# Patient Record
Sex: MALE | Race: WHITE | Employment: FULL TIME | URBAN - METROPOLITAN AREA
[De-identification: names, ages, dates, MRNs, and addresses within clinical notes are randomized per-mention and may not be internally consistent; named-entity substitution may affect disease eponyms.]

---

## 2017-01-26 ENCOUNTER — OFFICE VISIT (OUTPATIENT)
Dept: FAMILY MEDICINE CLINIC | Facility: CLINIC | Age: 42
End: 2017-01-26

## 2017-01-26 VITALS
WEIGHT: 190 LBS | SYSTOLIC BLOOD PRESSURE: 115 MMHG | DIASTOLIC BLOOD PRESSURE: 74 MMHG | BODY MASS INDEX: 25.18 KG/M2 | HEART RATE: 102 BPM | HEIGHT: 73 IN | TEMPERATURE: 98 F

## 2017-01-26 DIAGNOSIS — D72.819 LEUKOPENIA, UNSPECIFIED TYPE: ICD-10-CM

## 2017-01-26 DIAGNOSIS — Z12.11 COLON CANCER SCREENING: ICD-10-CM

## 2017-01-26 DIAGNOSIS — Z00.00 ROUTINE PHYSICAL EXAMINATION: Primary | ICD-10-CM

## 2017-01-26 DIAGNOSIS — J31.0 CHRONIC RHINITIS: ICD-10-CM

## 2017-01-26 PROCEDURE — 99396 PREV VISIT EST AGE 40-64: CPT | Performed by: FAMILY MEDICINE

## 2017-01-26 NOTE — PROGRESS NOTES
HPI:    Patient ID: Mariusz Still is a 37year old male. HPI Comments: Patient is here for routine physical exam. No acute issues. No significant chronic medical problems. Patient is requesting testing. Diet and exercise have been fair.  Past medical wheezes. He has no rales. Abdominal: Soft. Bowel sounds are normal. He exhibits no distension. There is no tenderness. There is no rebound and no guarding. Musculoskeletal: Normal range of motion. He exhibits no edema or tenderness.    Lymphadenopathy:

## 2017-02-02 ENCOUNTER — LAB ENCOUNTER (OUTPATIENT)
Dept: LAB | Age: 42
End: 2017-02-02
Attending: FAMILY MEDICINE

## 2017-02-02 DIAGNOSIS — Z00.00 ROUTINE PHYSICAL EXAMINATION: ICD-10-CM

## 2017-02-02 LAB
ALBUMIN SERPL BCP-MCNC: 4.2 G/DL (ref 3.5–4.8)
ALBUMIN/GLOB SERPL: 1.6 {RATIO} (ref 1–2)
ALP SERPL-CCNC: 43 U/L (ref 32–100)
ALT SERPL-CCNC: 18 U/L (ref 17–63)
ANION GAP SERPL CALC-SCNC: 9 MMOL/L (ref 0–18)
AST SERPL-CCNC: 18 U/L (ref 15–41)
BASOPHILS # BLD: 0 K/UL (ref 0–0.2)
BASOPHILS NFR BLD: 1 %
BILIRUB SERPL-MCNC: 0.9 MG/DL (ref 0.3–1.2)
BUN SERPL-MCNC: 12 MG/DL (ref 8–20)
BUN/CREAT SERPL: 12.9 (ref 10–20)
CALCIUM SERPL-MCNC: 9.4 MG/DL (ref 8.5–10.5)
CHLORIDE SERPL-SCNC: 103 MMOL/L (ref 95–110)
CHOLEST SERPL-MCNC: 176 MG/DL (ref 110–200)
CO2 SERPL-SCNC: 28 MMOL/L (ref 22–32)
CREAT SERPL-MCNC: 0.93 MG/DL (ref 0.5–1.5)
EOSINOPHIL # BLD: 0 K/UL (ref 0–0.7)
EOSINOPHIL NFR BLD: 1 %
ERYTHROCYTE [DISTWIDTH] IN BLOOD BY AUTOMATED COUNT: 12.8 % (ref 11–15)
GLOBULIN PLAS-MCNC: 2.6 G/DL (ref 2.5–3.7)
GLUCOSE SERPL-MCNC: 85 MG/DL (ref 70–99)
HCT VFR BLD AUTO: 44.2 % (ref 41–52)
HDLC SERPL-MCNC: 53 MG/DL
HGB BLD-MCNC: 14.6 G/DL (ref 13.5–17.5)
LDLC SERPL CALC-MCNC: 113 MG/DL (ref 0–99)
LYMPHOCYTES # BLD: 1.6 K/UL (ref 1–4)
LYMPHOCYTES NFR BLD: 51 %
MCH RBC QN AUTO: 32.2 PG (ref 27–32)
MCHC RBC AUTO-ENTMCNC: 33.1 G/DL (ref 32–37)
MCV RBC AUTO: 97.1 FL (ref 80–100)
MONOCYTES # BLD: 0.4 K/UL (ref 0–1)
MONOCYTES NFR BLD: 12 %
NEUTROPHILS # BLD AUTO: 1.1 K/UL (ref 1.8–7.7)
NEUTROPHILS NFR BLD: 35 %
NONHDLC SERPL-MCNC: 123 MG/DL
OSMOLALITY UR CALC.SUM OF ELEC: 289 MOSM/KG (ref 275–295)
PLATELET # BLD AUTO: 164 K/UL (ref 140–400)
PMV BLD AUTO: 9.9 FL (ref 7.4–10.3)
POTASSIUM SERPL-SCNC: 3.9 MMOL/L (ref 3.3–5.1)
PROT SERPL-MCNC: 6.8 G/DL (ref 5.9–8.4)
PSA SERPL-MCNC: 0.8 NG/ML (ref 0–4)
RBC # BLD AUTO: 4.55 M/UL (ref 4.5–5.9)
SODIUM SERPL-SCNC: 140 MMOL/L (ref 136–144)
TRIGL SERPL-MCNC: 50 MG/DL (ref 1–149)
WBC # BLD AUTO: 3.2 K/UL (ref 4–11)

## 2017-02-02 PROCEDURE — 80053 COMPREHEN METABOLIC PANEL: CPT

## 2017-02-02 PROCEDURE — 85025 COMPLETE CBC W/AUTO DIFF WBC: CPT

## 2017-02-02 PROCEDURE — 36415 COLL VENOUS BLD VENIPUNCTURE: CPT

## 2017-02-02 PROCEDURE — 80061 LIPID PANEL: CPT

## 2017-04-20 ENCOUNTER — TELEPHONE (OUTPATIENT)
Dept: FAMILY MEDICINE CLINIC | Facility: CLINIC | Age: 42
End: 2017-04-20

## 2017-04-20 RX ORDER — ALPRAZOLAM 0.25 MG/1
0.25 TABLET ORAL NIGHTLY PRN
Qty: 30 TABLET | Refills: 0 | OUTPATIENT
Start: 2017-04-20 | End: 2017-05-16

## 2017-04-20 NOTE — TELEPHONE ENCOUNTER
Actions Requested: Pt requesting medication for insomnia. Situation/Background   Problem: Insomnia. Sleeping 4 to 6 hours per night. Tired, mind racing.     Onset: 2-3 weeks  Associated Symptoms: Started new job, stressful, mind racing at night time and ca

## 2017-04-20 NOTE — TELEPHONE ENCOUNTER
Pt states that he is having a hard time falling asleep. Pt states that this has been going on for a couple of weeks and would like to know if doctor can prescribe a medication to him. Pt does not want to make an appt at this time.

## 2017-04-20 NOTE — TELEPHONE ENCOUNTER
On hold at [address] [facility] for 5 minutes, switched to voice mail box and left verbal order from Dr CALDERON alprazolam 0/25 mg one tablet by mouth daily as needed for sleep or anxiety. If any questions call nurse at [address] [place], [place] [address]r Elva rodrigues

## 2017-04-20 NOTE — TELEPHONE ENCOUNTER
Message noted. Chart reviewed. May call in xanax as needed /requested. Script reviewed and signed. Please call into pharmacy as this is controlled substance and notify patient. To follow up for appointment if not better.

## 2017-05-16 ENCOUNTER — TELEPHONE (OUTPATIENT)
Dept: GASTROENTEROLOGY | Facility: CLINIC | Age: 42
End: 2017-05-16

## 2017-05-16 ENCOUNTER — OFFICE VISIT (OUTPATIENT)
Dept: GASTROENTEROLOGY | Facility: CLINIC | Age: 42
End: 2017-05-16

## 2017-05-16 ENCOUNTER — OFFICE VISIT (OUTPATIENT)
Dept: FAMILY MEDICINE CLINIC | Facility: CLINIC | Age: 42
End: 2017-05-16

## 2017-05-16 VITALS
HEART RATE: 71 BPM | HEIGHT: 73 IN | BODY MASS INDEX: 24.78 KG/M2 | RESPIRATION RATE: 16 BRPM | TEMPERATURE: 98 F | WEIGHT: 187 LBS | DIASTOLIC BLOOD PRESSURE: 68 MMHG | SYSTOLIC BLOOD PRESSURE: 112 MMHG

## 2017-05-16 VITALS
BODY MASS INDEX: 25.05 KG/M2 | SYSTOLIC BLOOD PRESSURE: 123 MMHG | WEIGHT: 189 LBS | RESPIRATION RATE: 18 BRPM | DIASTOLIC BLOOD PRESSURE: 76 MMHG | TEMPERATURE: 98 F | HEIGHT: 73 IN | HEART RATE: 79 BPM

## 2017-05-16 DIAGNOSIS — J39.2 THROAT IRRITATION: ICD-10-CM

## 2017-05-16 DIAGNOSIS — K21.9 GASTROESOPHAGEAL REFLUX DISEASE, ESOPHAGITIS PRESENCE NOT SPECIFIED: Primary | ICD-10-CM

## 2017-05-16 DIAGNOSIS — Z12.11 SCREENING FOR COLON CANCER: ICD-10-CM

## 2017-05-16 DIAGNOSIS — Z80.0 FAMILY HISTORY OF COLON CANCER: Primary | ICD-10-CM

## 2017-05-16 PROCEDURE — 99212 OFFICE O/P EST SF 10 MIN: CPT | Performed by: FAMILY MEDICINE

## 2017-05-16 PROCEDURE — 99212 OFFICE O/P EST SF 10 MIN: CPT | Performed by: NURSE PRACTITIONER

## 2017-05-16 PROCEDURE — 99243 OFF/OP CNSLTJ NEW/EST LOW 30: CPT | Performed by: NURSE PRACTITIONER

## 2017-05-16 PROCEDURE — 99213 OFFICE O/P EST LOW 20 MIN: CPT | Performed by: FAMILY MEDICINE

## 2017-05-16 RX ORDER — OMEPRAZOLE 40 MG/1
40 CAPSULE, DELAYED RELEASE ORAL DAILY
Qty: 30 CAPSULE | Refills: 2 | Status: SHIPPED | OUTPATIENT
Start: 2017-05-16 | End: 2017-08-12

## 2017-05-16 NOTE — PATIENT INSTRUCTIONS
1. Schedule colonoscopy with Dr. Powers July w/ MAC    2.  bowel prep from pharmacy Suprep (eRx)    3. Continue all medications for procedure    4. Read all bowel prep instructions carefully    5.  AVOID seeds, nuts, popcorn, raw fruits and v

## 2017-05-16 NOTE — H&P
[address] [facility] [address] Gastroenterology                                                                                                  Clinic History and Physical     Pa dysphagia, odynophagia, hematemesis, or melena. Patient has a bowel movement each day. Additionally there is no change of appetite, weight loss, and no reported history of chest pain or shortness of breath.       Pertinent Surgical Hx:  Hemorrhoidectomy  - Oral Solution Take as directed per GI consult notes Disp: 1 Bottle Rfl: 0   Multiple Vitamins-Minerals (MULTI-VITAMIN/MINERALS) Oral Tab Take 1 tablet by mouth daily.  Disp:  Rfl:        Allergies:  No Known Allergies    ROS:   CONSTITUTIONAL:  negative for macrocytosis without anemia, hemorrhoidectomy, + FH colon cancer, who presents for colon cancer screening evaluation. No anemia noted on lab work dated 2/2/17.     1. Screening Colonoscopy: + family hx of colon cancer and it is appropriate to proceed w Sig: Take as directed per GI consult notes           Imaging & Referrals:  None       MARGARITA Barclay  5/16/2017

## 2017-05-16 NOTE — TELEPHONE ENCOUNTER
Pt here in office.      Scheduled for:  Colonoscopy 76533  Provider Name:Dr Dhillon  Date:  Wed June 28, 2017  Location: [facility]   Sedation:  MAC  Time:  9:45am, arrival 8: 45am  Prep:Suprep, sent per Kristal Yao to pharmacy  Meds/Allergies Reconciled?: yes   D

## 2017-05-16 NOTE — PROGRESS NOTES
HPI:    Patient ID: Franki Navarro is a 37year old male. HPI Comments: Pt presents with a cold feeling of his throat to upper chest area over the last couple months in the afternoons. Pt has had no pains. Pt has had no pattern with foods/ drinks.  No evaluation and treatment; To call if any significant symptoms. After discussion with patient, omeprazole as directed; Encouraged bland diet and lifestyle changes; Follow up as needed. No orders of the defined types were placed in this encounter.

## 2017-05-16 NOTE — TELEPHONE ENCOUNTER
Saw patient in office today. Pt having GERD and throat symptoms with eating over the last month. Hx of GERD in past. Pt requesting appointment to discuss possible upper endoscopy. Pt is already scheduled for colonoscopy.

## 2017-05-17 NOTE — TELEPHONE ENCOUNTER
Scheduled for:  Colonoscopy 54340 ADDED EGD 70863  Provider Name:  Dr. Nadia Ignacio  Date:    From-6/28/17  To-6/27/17  Location:  [facility]  Sedation:  MAC  Time:   From-0945  To-0930 (pt is aware to arrive at 0830)  Prep:  Suprep, mailed revised instructions on 5/17/17

## 2017-06-26 ENCOUNTER — TELEPHONE (OUTPATIENT)
Dept: GASTROENTEROLOGY | Facility: CLINIC | Age: 42
End: 2017-06-26

## 2017-06-26 NOTE — TELEPHONE ENCOUNTER
Pt did not follow instructions for prep kit correctly and is concerned if he is still ok to have CLN tomorrow.  Please Call Thank You

## 2017-06-26 NOTE — TELEPHONE ENCOUNTER
Pt contacted.   He did not follow prep instructions and ate a [place] around 12-1pm with virgen and dereck    Reviewed need to reschedule    Pt may be starting a new job, if so he will call us to reschedule    Otherwise we have rescheduled pt to    [facility]

## 2017-06-26 NOTE — TELEPHONE ENCOUNTER
Reviewed with pt that he is at [facility], given directions and that his arrival time for egd/colon is 8:30am. No further questions.

## 2017-07-07 NOTE — TELEPHONE ENCOUNTER
Scheduled for:  Colonoscopy 98417 and EGD [address]  Provider Name: Dr. Naty Lim  Date:  7/11/17  Location:  [facility]  Sedation:  MAC  Time:  9911 (pt is aware to arrive at 0945)   Prep:  Suprep, pt has instructions  Meds/Allergies Reconciled?:  Physician reviewed   Diag

## 2017-07-07 NOTE — TELEPHONE ENCOUNTER
Dr. Tyrone Houston called Erica to schedule this patient on 7/12/17 at 0930 since the block has dropped but couldn't  schedule him at that time but could at 1300 which Alcira and I thought that would be to late.   Erica gave me the choice to 7/11/17 at 1045 s

## 2017-07-10 ENCOUNTER — TELEPHONE (OUTPATIENT)
Dept: GASTROENTEROLOGY | Facility: CLINIC | Age: 42
End: 2017-07-10

## 2017-07-10 NOTE — TELEPHONE ENCOUNTER
Pt's wife transferred from phone room. Wanted to know what liquids pt could have tonight prior to tomorrow's colonoscopy. Reviewed clear liquids that are acceptable.

## 2017-07-11 ENCOUNTER — ANESTHESIA (OUTPATIENT)
Dept: ENDOSCOPY | Facility: HOSPITAL | Age: 42
End: 2017-07-11
Payer: COMMERCIAL

## 2017-07-11 ENCOUNTER — HOSPITAL ENCOUNTER (OUTPATIENT)
Facility: HOSPITAL | Age: 42
Setting detail: HOSPITAL OUTPATIENT SURGERY
Discharge: HOME | End: 2017-07-11
Attending: INTERNAL MEDICINE | Admitting: INTERNAL MEDICINE
Payer: COMMERCIAL

## 2017-07-11 ENCOUNTER — ANESTHESIA EVENT (OUTPATIENT)
Dept: ENDOSCOPY | Facility: HOSPITAL | Age: 42
End: 2017-07-11
Payer: COMMERCIAL

## 2017-07-11 ENCOUNTER — SURGERY (OUTPATIENT)
Age: 42
End: 2017-07-11

## 2017-07-11 DIAGNOSIS — Z80.0 FAMILY HISTORY OF COLON CANCER: ICD-10-CM

## 2017-07-11 DIAGNOSIS — K44.9 HIATAL HERNIA: ICD-10-CM

## 2017-07-11 DIAGNOSIS — K63.5 COLON POLYP: Primary | ICD-10-CM

## 2017-07-11 DIAGNOSIS — K21.9 GASTROESOPHAGEAL REFLUX DISEASE: ICD-10-CM

## 2017-07-11 DIAGNOSIS — Z12.11 SCREENING FOR MALIGNANT NEOPLASM OF COLON: ICD-10-CM

## 2017-07-11 DIAGNOSIS — K31.7 GASTRIC POLYPS: ICD-10-CM

## 2017-07-11 PROCEDURE — 88312 SPECIAL STAINS GROUP 1: CPT | Performed by: INTERNAL MEDICINE

## 2017-07-11 PROCEDURE — 88305 TISSUE EXAM BY PATHOLOGIST: CPT | Performed by: INTERNAL MEDICINE

## 2017-07-11 PROCEDURE — 0DB68ZX EXCISION OF STOMACH, VIA NATURAL OR ARTIFICIAL OPENING ENDOSCOPIC, DIAGNOSTIC: ICD-10-PCS | Performed by: INTERNAL MEDICINE

## 2017-07-11 PROCEDURE — 0DBL8ZX EXCISION OF TRANSVERSE COLON, VIA NATURAL OR ARTIFICIAL OPENING ENDOSCOPIC, DIAGNOSTIC: ICD-10-PCS | Performed by: INTERNAL MEDICINE

## 2017-07-11 PROCEDURE — 36415 COLL VENOUS BLD VENIPUNCTURE: CPT | Performed by: INTERNAL MEDICINE

## 2017-07-11 RX ORDER — SODIUM CHLORIDE, SODIUM LACTATE, POTASSIUM CHLORIDE, CALCIUM CHLORIDE 600; 310; 30; 20 MG/100ML; MG/100ML; MG/100ML; MG/100ML
INJECTION, SOLUTION INTRAVENOUS CONTINUOUS
Status: DISCONTINUED | OUTPATIENT
Start: 2017-07-11 | End: 2017-07-11

## 2017-07-11 RX ORDER — LIDOCAINE HYDROCHLORIDE 10 MG/ML
INJECTION, SOLUTION EPIDURAL; INFILTRATION; INTRACAUDAL; PERINEURAL AS NEEDED
Status: DISCONTINUED | OUTPATIENT
Start: 2017-07-11 | End: 2017-07-11 | Stop reason: SURG

## 2017-07-11 RX ORDER — SODIUM CHLORIDE, SODIUM LACTATE, POTASSIUM CHLORIDE, CALCIUM CHLORIDE 600; 310; 30; 20 MG/100ML; MG/100ML; MG/100ML; MG/100ML
INJECTION, SOLUTION INTRAVENOUS CONTINUOUS PRN
Status: DISCONTINUED | OUTPATIENT
Start: 2017-07-11 | End: 2017-07-11 | Stop reason: SURG

## 2017-07-11 RX ORDER — NALOXONE HYDROCHLORIDE 0.4 MG/ML
80 INJECTION, SOLUTION INTRAMUSCULAR; INTRAVENOUS; SUBCUTANEOUS AS NEEDED
Status: DISCONTINUED | OUTPATIENT
Start: 2017-07-11 | End: 2017-07-11

## 2017-07-11 RX ADMIN — SODIUM CHLORIDE, SODIUM LACTATE, POTASSIUM CHLORIDE, CALCIUM CHLORIDE: 600; 310; 30; 20 INJECTION, SOLUTION INTRAVENOUS at 12:08:00

## 2017-07-11 RX ADMIN — LIDOCAINE HYDROCHLORIDE 25 MG: 10 INJECTION, SOLUTION EPIDURAL; INFILTRATION; INTRACAUDAL; PERINEURAL at 12:11:00

## 2017-07-11 RX ADMIN — SODIUM CHLORIDE, SODIUM LACTATE, POTASSIUM CHLORIDE, CALCIUM CHLORIDE: 600; 310; 30; 20 INJECTION, SOLUTION INTRAVENOUS at 12:50:00

## 2017-07-11 NOTE — OPERATIVE REPORT
[facility] Endoscopy Report      Date of Procedure:  07/11/17      Preoperative Diagnosis:  1. Family history of colon cancer  2. Gastroesophageal reflux      Postoperative Diagnosis:  1. Colon polyp  2. Gastric polyps  3.   Small hiatal preparation of the colon was good. The terminal ileum was examined for 5 cm and visually normal.  The ileocecal valve was well preserved. The visualized colonic mucosa from the cecum to the anal verge was normal with an intact vascular pattern.   In the [facility]

## 2017-07-11 NOTE — H&P
History & Physical Examination    Patient Name: ePnnie Lubin  MRN: [id number]  [facility]: [id number]  YOB: 1975    Diagnosis: Family history of colon cancer and gastroesophageal reflux        Prescriptions Prior to Admission:  Multiple V is Normal If not normal, please explain:   ARELY Rogers ] [ Ari Rogers ] [ Jose Rogers ] [ Robbie Rogers ] [ Jayuya Smithfield Sue ] [ Jaime Jerel Rogers ] [ Neo Brownzing        [ x ] I have discussed the risks and benefits and alternatives with the patien

## 2017-07-11 NOTE — ANESTHESIA PREPROCEDURE EVALUATION
Anesthesia PreOp Note    HPI:     Tate Harper is a 37year old male who presents for preoperative consultation requested by: Yoandy Redd MD    Date of Surgery: 7/11/2017    Procedure(s):  ESOPHAGOGASTRODUODENOSCOPY (EGD)  COLONOSCOPY • Colon Cancer Other      family h/o   • Hypertension Other      family h/o   • Neurological Disorder Other      family h/o Parkinson's Disease       Social History  Social History   Marital status:   Spouse name: N/A    Years of education: N/A  N

## 2017-07-11 NOTE — ANESTHESIA POSTPROCEDURE EVALUATION
Patient: Aure Ruiz    Procedure Summary     Date:  07/11/17 Room / Location:  [facility] ENDOSCOPY 01 / [facility] ENDOSCOPY    Anesthesia Start:  2001 Anesthesia Stop:      Procedures:       ESOPHAGOGASTRODUODENOSCOPY (EGD) (N/A )      COLONOSCOPY (N/A ) D

## 2017-07-12 ENCOUNTER — TELEPHONE (OUTPATIENT)
Dept: GASTROENTEROLOGY | Facility: CLINIC | Age: 42
End: 2017-07-12

## 2017-07-12 NOTE — TELEPHONE ENCOUNTER
----- Message from Bina Vidales MD sent at 7/12/2017 10:52 AM CDT -----  Justin Guevara,    Please inform the patient that his colon polyp was a benign tubular adenoma (the type of polyp that has a potential to become a tumor or cancer, however, small ciro

## 2017-07-13 VITALS
OXYGEN SATURATION: 100 % | WEIGHT: 185 LBS | HEART RATE: 51 BPM | RESPIRATION RATE: 12 BRPM | SYSTOLIC BLOOD PRESSURE: 122 MMHG | BODY MASS INDEX: 25.06 KG/M2 | HEIGHT: 72 IN | DIASTOLIC BLOOD PRESSURE: 83 MMHG

## 2017-08-12 ENCOUNTER — OFFICE VISIT (OUTPATIENT)
Dept: FAMILY MEDICINE CLINIC | Facility: CLINIC | Age: 42
End: 2017-08-12

## 2017-08-12 VITALS
HEIGHT: 72 IN | WEIGHT: 189 LBS | BODY MASS INDEX: 25.6 KG/M2 | DIASTOLIC BLOOD PRESSURE: 82 MMHG | TEMPERATURE: 98 F | SYSTOLIC BLOOD PRESSURE: 135 MMHG | HEART RATE: 54 BPM

## 2017-08-12 DIAGNOSIS — R07.89 CHEST PRESSURE: ICD-10-CM

## 2017-08-12 DIAGNOSIS — N52.9 ERECTILE DYSFUNCTION, UNSPECIFIED ERECTILE DYSFUNCTION TYPE: ICD-10-CM

## 2017-08-12 DIAGNOSIS — F41.9 ANXIETY: ICD-10-CM

## 2017-08-12 PROCEDURE — 99212 OFFICE O/P EST SF 10 MIN: CPT | Performed by: FAMILY MEDICINE

## 2017-08-12 PROCEDURE — 99213 OFFICE O/P EST LOW 20 MIN: CPT | Performed by: FAMILY MEDICINE

## 2017-08-12 RX ORDER — SILDENAFIL 50 MG/1
50 TABLET, FILM COATED ORAL
Qty: 10 TABLET | Refills: 0 | Status: SHIPPED | OUTPATIENT
Start: 2017-08-12 | End: 2017-09-11

## 2017-08-12 RX ORDER — ALPRAZOLAM 0.25 MG/1
0.25 TABLET ORAL NIGHTLY PRN
Qty: 30 TABLET | Refills: 1 | Status: SHIPPED | OUTPATIENT
Start: 2017-08-12 | End: 2018-04-19 | Stop reason: ALTCHOICE

## 2017-08-12 NOTE — PROGRESS NOTES
HPI:    Patient ID: Barber Bojorquez is a 37year old male. Pt presents with some anxiety and stress from work related issues. This has caused some intermittent ED issues and feelings of palpitations and chest pressure at times.  Pt has some discom heard.  Pulmonary/Chest: Effort normal and breath sounds normal. No respiratory distress. He has no wheezes. He has no rales. He exhibits no tenderness. Psychiatric: He has a normal mood and affect.  His behavior is normal. Judgment and thought content no

## 2017-08-31 ENCOUNTER — TELEPHONE (OUTPATIENT)
Dept: FAMILY MEDICINE CLINIC | Facility: CLINIC | Age: 42
End: 2017-08-31

## 2017-08-31 NOTE — TELEPHONE ENCOUNTER
This case has been denied. A ECG, ECHO or prior STRESS TESTING is needed. Spoke with patient and he indicated he is not going to have the test done.      Thank you,

## 2018-03-13 ENCOUNTER — OFFICE VISIT (OUTPATIENT)
Dept: OTOLARYNGOLOGY | Facility: CLINIC | Age: 43
End: 2018-03-13

## 2018-03-13 VITALS
SYSTOLIC BLOOD PRESSURE: 118 MMHG | TEMPERATURE: 98 F | DIASTOLIC BLOOD PRESSURE: 78 MMHG | BODY MASS INDEX: 25.06 KG/M2 | HEIGHT: 72 IN | WEIGHT: 185 LBS

## 2018-03-13 DIAGNOSIS — J32.9 CHRONIC SINUSITIS, UNSPECIFIED LOCATION: Primary | ICD-10-CM

## 2018-03-13 DIAGNOSIS — J34.2 DEVIATED NASAL SEPTUM: ICD-10-CM

## 2018-03-13 PROCEDURE — 99214 OFFICE O/P EST MOD 30 MIN: CPT | Performed by: OTOLARYNGOLOGY

## 2018-03-13 PROCEDURE — 99212 OFFICE O/P EST SF 10 MIN: CPT | Performed by: OTOLARYNGOLOGY

## 2018-03-14 NOTE — PROGRESS NOTES
Marcin Trinh is a 40year old male.   Patient presents with:  Sinus Problem: pt reports constant nasal drip for many yrs, would to discuss surgery      HISTORY OF PRESENT ILLNESS  2011 He presents today with a long history of chronic nasal obstruc Spouse name:                       Years of education:                 Number of children:               Social History Main Topics    Smoking status: Never Smoker                                                                Smokeless tobacco: Never Used Negative Frequent skin infections, pigment change and rash. Hema/Lymph Negative Easy bleeding and easy bruising.            PHYSICAL EXAM    /78 (BP Location: Left arm, Patient Position: Sitting, Cuff Size: adult)   Temp 97.6 °F (36.4 °C) (Tympanic) tablet, Rfl: 1  ASSESSMENT AND PLAN    1. Chronic sinusitis, unspecified location  - CT SINUS LIMITED EM (CPT=70486); Future    2. Deviated nasal septum  He has an anterior deviation of the septum to the left and very large turbinates bilaterally.   He does

## 2018-03-20 ENCOUNTER — TELEPHONE (OUTPATIENT)
Dept: OTOLARYNGOLOGY | Facility: CLINIC | Age: 43
End: 2018-03-20

## 2018-03-20 NOTE — TELEPHONE ENCOUNTER
Ritu from [facility] health. Received a requesting for a ct scan of the sinus ordered by dr Chikis Gibbons to be done at [facility]. Pt's health plan is [employer] of [place]. [employer] has review the request and can not approve the ct scan.    Case is pending for

## 2018-03-21 NOTE — TELEPHONE ENCOUNTER
Pt informed he should contact his insurance company to verify his benefits and OOP costs he may incur with the CT SINUS. Pt verbalized understanding.

## 2018-03-21 NOTE — TELEPHONE ENCOUNTER
Per Madonna Yanes ([address]), CT SINUS has been approved, #[id number], valid 3/21/18 - 4/19/18. LM for pt to call back.   Authorization rec'd and pt may schedule CT; however, pt should contact insurance plan to verify benefits and any OOP costs he may

## 2018-04-09 ENCOUNTER — HOSPITAL ENCOUNTER (OUTPATIENT)
Dept: CT IMAGING | Facility: HOSPITAL | Age: 43
Discharge: HOME | End: 2018-04-09
Attending: OTOLARYNGOLOGY
Payer: COMMERCIAL

## 2018-04-09 DIAGNOSIS — J32.9 CHRONIC SINUSITIS, UNSPECIFIED LOCATION: ICD-10-CM

## 2018-04-09 PROCEDURE — 70486 CT MAXILLOFACIAL W/O DYE: CPT | Performed by: OTOLARYNGOLOGY

## 2018-04-19 ENCOUNTER — OFFICE VISIT (OUTPATIENT)
Dept: OTOLARYNGOLOGY | Facility: CLINIC | Age: 43
End: 2018-04-19

## 2018-04-19 VITALS
SYSTOLIC BLOOD PRESSURE: 122 MMHG | WEIGHT: 185 LBS | HEIGHT: 72 IN | TEMPERATURE: 98 F | BODY MASS INDEX: 25.06 KG/M2 | DIASTOLIC BLOOD PRESSURE: 74 MMHG

## 2018-04-19 DIAGNOSIS — J34.2 DEVIATED NASAL SEPTUM: Primary | ICD-10-CM

## 2018-04-19 PROCEDURE — 99212 OFFICE O/P EST SF 10 MIN: CPT | Performed by: OTOLARYNGOLOGY

## 2018-04-19 PROCEDURE — 99214 OFFICE O/P EST MOD 30 MIN: CPT | Performed by: OTOLARYNGOLOGY

## 2018-04-19 RX ORDER — MONTELUKAST SODIUM 10 MG/1
10 TABLET ORAL NIGHTLY
Qty: 30 TABLET | Refills: 3 | Status: SHIPPED | OUTPATIENT
Start: 2018-04-19 | End: 2019-01-18

## 2018-04-19 RX ORDER — LORATADINE 10 MG/1
10 TABLET ORAL DAILY
Qty: 30 TABLET | Refills: 3 | Status: SHIPPED | OUTPATIENT
Start: 2018-04-19 | End: 2019-03-03

## 2018-04-19 RX ORDER — METHSCOPOLAMINE BROMIDE 2.5 MG/1
2.5 TABLET ORAL 2 TIMES DAILY
Qty: 60 TABLET | Refills: 3 | Status: SHIPPED | OUTPATIENT
Start: 2018-04-19 | End: 2019-01-21

## 2018-04-20 NOTE — PROGRESS NOTES
Pennie Lubin is a 40year old male. Patient presents with:  Results: CT scan done on 4/9/18      HISTORY OF PRESENT ILLNESS  2011 He presents today with a long history of chronic nasal obstruction and postnasal drip.  He does have a previous hist over the results as read by the radiologist.  Brooke Hodges reveals no significant sinus disease with some mucosal thickening in the maxillary sinuses left greater than right.   He does have a caudal edge deviation to the left causing some obstruction but he denies ENMT Negative Drooling. Eyes Negative Blurred vision and vision changes. Respiratory Negative Dyspnea and wheezing. Cardio Negative Chest pain, irregular heartbeat/palpitations and syncope. GI Negative Abdominal pain and diarrhea.    Endocrine Neg Prescriptions:   •  Methscopolamine Bromide 2.5 MG Oral Tab, Take 1 tablet (2.5 mg total) by mouth 2 (two) times daily. , Disp: 60 tablet, Rfl: 3  •  Montelukast Sodium 10 MG Oral Tab, Take 1 tablet (10 mg total) by mouth nightly., Disp: 30 tablet, Rfl: 3

## 2019-01-18 RX ORDER — MONTELUKAST SODIUM 10 MG/1
TABLET ORAL
Qty: 30 TABLET | Refills: 3 | Status: SHIPPED | OUTPATIENT
Start: 2019-01-18 | End: 2019-07-10

## 2019-01-21 RX ORDER — METHSCOPOLAMINE BROMIDE 2.5 MG/1
TABLET ORAL
Qty: 60 TABLET | Refills: 3 | Status: SHIPPED | OUTPATIENT
Start: 2019-01-21 | End: 2019-07-10

## 2019-03-04 RX ORDER — LORATADINE 10 MG/1
TABLET ORAL
Qty: 30 TABLET | Refills: 1 | Status: SHIPPED | OUTPATIENT
Start: 2019-03-04 | End: 2019-07-10

## 2019-05-14 RX ORDER — LORATADINE 10 MG/1
TABLET ORAL
Qty: 30 TABLET | Refills: 0 | OUTPATIENT
Start: 2019-05-14

## 2019-06-11 RX ORDER — METHSCOPOLAMINE BROMIDE 2.5 MG/1
TABLET ORAL
Qty: 60 TABLET | Refills: 0 | OUTPATIENT
Start: 2019-06-11

## 2019-07-10 ENCOUNTER — OFFICE VISIT (OUTPATIENT)
Dept: FAMILY MEDICINE CLINIC | Facility: CLINIC | Age: 44
End: 2019-07-10
Payer: COMMERCIAL

## 2019-07-10 VITALS
HEART RATE: 66 BPM | TEMPERATURE: 98 F | BODY MASS INDEX: 26.45 KG/M2 | WEIGHT: 191 LBS | RESPIRATION RATE: 18 BRPM | SYSTOLIC BLOOD PRESSURE: 116 MMHG | HEIGHT: 71.3 IN | DIASTOLIC BLOOD PRESSURE: 75 MMHG

## 2019-07-10 DIAGNOSIS — Z87.19 HISTORY OF HEMORRHOIDS: ICD-10-CM

## 2019-07-10 DIAGNOSIS — Z00.00 ROUTINE PHYSICAL EXAMINATION: ICD-10-CM

## 2019-07-10 PROCEDURE — 99396 PREV VISIT EST AGE 40-64: CPT | Performed by: FAMILY MEDICINE

## 2019-07-10 RX ORDER — LORATADINE 10 MG/1
TABLET ORAL
Qty: 30 TABLET | Refills: 5 | Status: SHIPPED | OUTPATIENT
Start: 2019-07-10 | End: 2019-12-15

## 2019-07-10 RX ORDER — MONTELUKAST SODIUM 10 MG/1
10 TABLET ORAL
COMMUNITY
End: 2019-07-10

## 2019-07-10 RX ORDER — MONTELUKAST SODIUM 10 MG/1
TABLET ORAL
Qty: 30 TABLET | Refills: 5 | Status: SHIPPED | OUTPATIENT
Start: 2019-07-10 | End: 2019-12-15

## 2019-07-10 RX ORDER — METHSCOPOLAMINE BROMIDE 2.5 MG/1
TABLET ORAL
Qty: 60 TABLET | Refills: 5 | Status: SHIPPED | OUTPATIENT
Start: 2019-07-10 | End: 2019-12-15

## 2019-07-10 NOTE — PROGRESS NOTES
HPI:    Patient ID: Marian Summers is a 44year old male. Patient is here for routine physical exam. No acute issues. No significant chronic medical problems. Patient is requesting testing. Diet and exercise have been generally good.  Past medica atraumatic.    Right Ear: Tympanic membrane, external ear and ear canal normal.   Left Ear: Tympanic membrane, external ear and ear canal normal.   Nose: Nose normal.   Mouth/Throat: Oropharynx is clear and moist.   Eyes: Pupils are equal, round, and reacti Bromide 2.5 MG Oral Tab 60 tablet 5     Sig: TAKE 1 TABLET(2.5 MG) BY MOUTH TWICE DAILY       Imaging & Referrals:  SURGERY - INTERNAL       DY#[id number]

## 2019-08-08 ENCOUNTER — APPOINTMENT (OUTPATIENT)
Dept: LAB | Age: 44
End: 2019-08-08
Attending: FAMILY MEDICINE
Payer: COMMERCIAL

## 2019-08-08 ENCOUNTER — TELEPHONE (OUTPATIENT)
Dept: FAMILY MEDICINE CLINIC | Facility: CLINIC | Age: 44
End: 2019-08-08

## 2019-08-08 DIAGNOSIS — D72.819 LEUKOPENIA, UNSPECIFIED TYPE: Primary | ICD-10-CM

## 2019-08-08 DIAGNOSIS — Z00.00 ROUTINE PHYSICAL EXAMINATION: ICD-10-CM

## 2019-08-08 LAB
ALBUMIN SERPL-MCNC: 4.1 G/DL (ref 3.4–5)
ALBUMIN/GLOB SERPL: 1.3 {RATIO} (ref 1–2)
ALP LIVER SERPL-CCNC: 59 U/L (ref 45–117)
ALT SERPL-CCNC: 27 U/L (ref 16–61)
ANION GAP SERPL CALC-SCNC: 8 MMOL/L (ref 0–18)
AST SERPL-CCNC: 19 U/L (ref 15–37)
BILIRUB SERPL-MCNC: 0.7 MG/DL (ref 0.1–2)
BUN BLD-MCNC: 16 MG/DL (ref 7–18)
BUN/CREAT SERPL: 17.2 (ref 10–20)
CALCIUM BLD-MCNC: 9 MG/DL (ref 8.5–10.1)
CHLORIDE SERPL-SCNC: 107 MMOL/L (ref 98–112)
CHOLEST SMN-MCNC: 180 MG/DL (ref ?–200)
CO2 SERPL-SCNC: 27 MMOL/L (ref 21–32)
CREAT BLD-MCNC: 0.93 MG/DL (ref 0.7–1.3)
DEPRECATED RDW RBC AUTO: 43.9 FL (ref 35.1–46.3)
ERYTHROCYTE [DISTWIDTH] IN BLOOD BY AUTOMATED COUNT: 12.3 % (ref 11–15)
GLOBULIN PLAS-MCNC: 3.1 G/DL (ref 2.8–4.4)
GLUCOSE BLD-MCNC: 92 MG/DL (ref 70–99)
HCT VFR BLD AUTO: 42.5 % (ref 39–53)
HDLC SERPL-MCNC: 66 MG/DL (ref 40–59)
HGB BLD-MCNC: 14.4 G/DL (ref 13–17.5)
LDLC SERPL CALC-MCNC: 102 MG/DL (ref ?–100)
M PROTEIN MFR SERPL ELPH: 7.2 G/DL (ref 6.4–8.2)
MCH RBC QN AUTO: 32.9 PG (ref 26–34)
MCHC RBC AUTO-ENTMCNC: 33.9 G/DL (ref 31–37)
MCV RBC AUTO: 97 FL (ref 80–100)
NONHDLC SERPL-MCNC: 114 MG/DL (ref ?–130)
OSMOLALITY SERPL CALC.SUM OF ELEC: 295 MOSM/KG (ref 275–295)
PATIENT FASTING: YES
PATIENT FASTING: YES
PLATELET # BLD AUTO: 162 10(3)UL (ref 150–450)
POTASSIUM SERPL-SCNC: 4 MMOL/L (ref 3.5–5.1)
RBC # BLD AUTO: 4.38 X10(6)UL (ref 4.3–5.7)
SODIUM SERPL-SCNC: 142 MMOL/L (ref 136–145)
TESTOST SERPL-MCNC: 485.49 NG/DL
TRIGL SERPL-MCNC: 60 MG/DL (ref 30–149)
VLDLC SERPL CALC-MCNC: 12 MG/DL (ref 0–30)
WBC # BLD AUTO: 3 X10(3) UL (ref 4–11)

## 2019-08-08 PROCEDURE — 84403 ASSAY OF TOTAL TESTOSTERONE: CPT

## 2019-08-08 PROCEDURE — 36415 COLL VENOUS BLD VENIPUNCTURE: CPT

## 2019-08-08 PROCEDURE — 85027 COMPLETE CBC AUTOMATED: CPT

## 2019-08-08 PROCEDURE — 80053 COMPREHEN METABOLIC PANEL: CPT

## 2019-08-08 PROCEDURE — 80061 LIPID PANEL: CPT

## 2019-08-08 NOTE — TELEPHONE ENCOUNTER
Referral request noted. Referral approved, generated and sent to [facility].

## 2019-12-16 RX ORDER — METHSCOPOLAMINE BROMIDE 2.5 MG/1
TABLET ORAL
Qty: 180 TABLET | Refills: 1 | Status: SHIPPED | OUTPATIENT
Start: 2019-12-16 | End: 2020-11-18

## 2019-12-16 RX ORDER — MONTELUKAST SODIUM 10 MG/1
TABLET ORAL
Qty: 90 TABLET | Refills: 1 | Status: SHIPPED | OUTPATIENT
Start: 2019-12-16 | End: 2020-06-09

## 2019-12-16 RX ORDER — LORATADINE 10 MG/1
TABLET ORAL
Qty: 90 TABLET | Refills: 1 | Status: SHIPPED | OUTPATIENT
Start: 2019-12-16 | End: 2020-06-09

## 2019-12-16 NOTE — TELEPHONE ENCOUNTER
Refill passed per [facility], [facility] protocol.   Refill Protocol Appointment Criteria  · Appointment scheduled in the past 6 months or in the next 3 months  Recent Outpatient Visits            5 months ago Routine physical examination    [address] Ti Black MD    Office Visit    1 year ago Deviated nasal septum    [facility] [place], Justin Garcia MD    Office Visit    1 year ago Chronic sinusitis, unspecified location    [facility] for Simone Rosebush

## 2020-06-09 RX ORDER — MONTELUKAST SODIUM 10 MG/1
TABLET ORAL
Qty: 90 TABLET | Refills: 1 | Status: SHIPPED | OUTPATIENT
Start: 2020-06-09 | End: 2020-11-18

## 2020-06-09 RX ORDER — LORATADINE 10 MG/1
TABLET ORAL
Qty: 90 TABLET | Refills: 1 | Status: SHIPPED | OUTPATIENT
Start: 2020-06-09 | End: 2020-11-18

## 2020-11-18 ENCOUNTER — VIRTUAL PHONE E/M (OUTPATIENT)
Dept: FAMILY MEDICINE CLINIC | Facility: CLINIC | Age: 45
End: 2020-11-18
Payer: COMMERCIAL

## 2020-11-18 DIAGNOSIS — G25.81 RESTLESS LEGS SYNDROME: ICD-10-CM

## 2020-11-18 PROCEDURE — 99213 OFFICE O/P EST LOW 20 MIN: CPT | Performed by: FAMILY MEDICINE

## 2020-11-18 RX ORDER — METHSCOPOLAMINE BROMIDE 2.5 MG/1
2.5 TABLET ORAL 2 TIMES DAILY
Qty: 180 TABLET | Refills: 1 | Status: SHIPPED | OUTPATIENT
Start: 2020-11-18 | End: 2021-06-01

## 2020-11-18 RX ORDER — LORATADINE 10 MG/1
TABLET ORAL
Qty: 90 TABLET | Refills: 1 | Status: SHIPPED | OUTPATIENT
Start: 2020-11-18 | End: 2021-05-29

## 2020-11-18 RX ORDER — GABAPENTIN 100 MG/1
100 CAPSULE ORAL NIGHTLY
Qty: 30 CAPSULE | Refills: 0 | Status: SHIPPED | OUTPATIENT
Start: 2020-11-18 | End: 2021-09-08

## 2020-11-18 RX ORDER — MONTELUKAST SODIUM 10 MG/1
TABLET ORAL
Qty: 90 TABLET | Refills: 1 | Status: SHIPPED | OUTPATIENT
Start: 2020-11-18 | End: 2021-05-29

## 2020-11-18 NOTE — PROGRESS NOTES
HPI:    Patient ID: Angie Miles is a 41year old male. Virtual Telephone Check-In    Angie Miles verbally consents to a Virtual/Telephone Check-In visit on 11/18/20. Patient has been referred to the [facility] website at [address] [place] [address] oriented to person, place, and time. Psychiatric: He has a normal mood and affect.  His behavior is normal. Judgment and thought content normal.              ASSESSMENT/PLAN:   Restless legs syndrome with sleep disturbance  - After discussion, will start

## 2021-05-29 RX ORDER — LORATADINE 10 MG/1
TABLET ORAL
Qty: 90 TABLET | Refills: 1 | Status: SHIPPED | OUTPATIENT
Start: 2021-05-29 | End: 2021-12-06

## 2021-05-29 RX ORDER — MONTELUKAST SODIUM 10 MG/1
TABLET ORAL
Qty: 90 TABLET | Refills: 1 | Status: SHIPPED | OUTPATIENT
Start: 2021-05-29 | End: 2021-12-06

## 2021-06-01 RX ORDER — METHSCOPOLAMINE BROMIDE 2.5 MG/1
2.5 TABLET ORAL 2 TIMES DAILY
Qty: 180 TABLET | Refills: 1 | Status: SHIPPED | OUTPATIENT
Start: 2021-06-01 | End: 2021-12-13

## 2021-09-08 ENCOUNTER — OFFICE VISIT (OUTPATIENT)
Dept: FAMILY MEDICINE CLINIC | Facility: CLINIC | Age: 46
End: 2021-09-08
Payer: COMMERCIAL

## 2021-09-08 VITALS — HEIGHT: 71.3 IN | BODY MASS INDEX: 25.34 KG/M2 | RESPIRATION RATE: 18 BRPM | TEMPERATURE: 98 F | WEIGHT: 183 LBS

## 2021-09-08 DIAGNOSIS — Z12.11 COLON CANCER SCREENING: ICD-10-CM

## 2021-09-08 DIAGNOSIS — Z00.00 ROUTINE PHYSICAL EXAMINATION: Primary | ICD-10-CM

## 2021-09-08 DIAGNOSIS — D72.819 LEUKOPENIA, UNSPECIFIED TYPE: ICD-10-CM

## 2021-09-08 PROCEDURE — 3008F BODY MASS INDEX DOCD: CPT | Performed by: FAMILY MEDICINE

## 2021-09-08 PROCEDURE — 99396 PREV VISIT EST AGE 40-64: CPT | Performed by: FAMILY MEDICINE

## 2021-09-08 NOTE — PROGRESS NOTES
HPI/Subjective:   Patient ID: Saba Colon is a 41year old male. Patient is here for routine physical exam. No acute issues. No significant chronic medical problems. Patient is requesting testing. Diet and exercise have been fair.  Past medica Right Ear: Tympanic membrane, ear canal and external ear normal.      Left Ear: Tympanic membrane, ear canal and external ear normal.      Nose: Nose normal.      Mouth/Throat:      Mouth: Mucous membranes are moist.      Pharynx: No oropharyngeal exudat Platelet;  No Differential      Comp Metabolic Panel (14)      Meds This Visit:  Requested Prescriptions      No prescriptions requested or ordered in this encounter       Imaging & Referrals:  ONCOLOGY/HEMATOLOGY - INTERNAL  GASTRO - INTERNAL

## 2021-09-10 ENCOUNTER — LAB ENCOUNTER (OUTPATIENT)
Dept: LAB | Age: 46
End: 2021-09-10
Attending: FAMILY MEDICINE
Payer: COMMERCIAL

## 2021-09-10 DIAGNOSIS — Z00.00 ROUTINE PHYSICAL EXAMINATION: ICD-10-CM

## 2021-09-10 LAB
ALBUMIN SERPL-MCNC: 4 G/DL (ref 3.4–5)
ALBUMIN/GLOB SERPL: 1.3 {RATIO} (ref 1–2)
ALP LIVER SERPL-CCNC: 58 U/L
ALT SERPL-CCNC: 24 U/L
ANION GAP SERPL CALC-SCNC: 6 MMOL/L (ref 0–18)
AST SERPL-CCNC: 17 U/L (ref 15–37)
BILIRUB SERPL-MCNC: 0.6 MG/DL (ref 0.1–2)
BUN BLD-MCNC: 15 MG/DL (ref 7–18)
BUN/CREAT SERPL: 17.4 (ref 10–20)
CALCIUM BLD-MCNC: 9 MG/DL (ref 8.5–10.1)
CHLORIDE SERPL-SCNC: 108 MMOL/L (ref 98–112)
CHOLEST SMN-MCNC: 182 MG/DL (ref ?–200)
CO2 SERPL-SCNC: 26 MMOL/L (ref 21–32)
COMPLEXED PSA SERPL-MCNC: 0.78 NG/ML (ref ?–4)
CREAT BLD-MCNC: 0.86 MG/DL
DEPRECATED RDW RBC AUTO: 43.8 FL (ref 35.1–46.3)
ERYTHROCYTE [DISTWIDTH] IN BLOOD BY AUTOMATED COUNT: 11.9 % (ref 11–15)
GLOBULIN PLAS-MCNC: 3.2 G/DL (ref 2.8–4.4)
GLUCOSE BLD-MCNC: 94 MG/DL (ref 70–99)
HCT VFR BLD AUTO: 43.9 %
HDLC SERPL-MCNC: 62 MG/DL (ref 40–59)
HGB BLD-MCNC: 14.8 G/DL
LDLC SERPL CALC-MCNC: 108 MG/DL (ref ?–100)
M PROTEIN MFR SERPL ELPH: 7.2 G/DL (ref 6.4–8.2)
MCH RBC QN AUTO: 33.6 PG (ref 26–34)
MCHC RBC AUTO-ENTMCNC: 33.7 G/DL (ref 31–37)
MCV RBC AUTO: 99.8 FL
NONHDLC SERPL-MCNC: 120 MG/DL (ref ?–130)
OSMOLALITY SERPL CALC.SUM OF ELEC: 291 MOSM/KG (ref 275–295)
PATIENT FASTING Y/N/NP: YES
PATIENT FASTING Y/N/NP: YES
PLATELET # BLD AUTO: 157 10(3)UL (ref 150–450)
POTASSIUM SERPL-SCNC: 4.1 MMOL/L (ref 3.5–5.1)
RBC # BLD AUTO: 4.4 X10(6)UL
SODIUM SERPL-SCNC: 140 MMOL/L (ref 136–145)
TRIGL SERPL-MCNC: 63 MG/DL (ref 30–149)
VLDLC SERPL CALC-MCNC: 11 MG/DL (ref 0–30)
WBC # BLD AUTO: 4.4 X10(3) UL (ref 4–11)

## 2021-09-10 PROCEDURE — 85027 COMPLETE CBC AUTOMATED: CPT

## 2021-09-10 PROCEDURE — 80061 LIPID PANEL: CPT

## 2021-09-10 PROCEDURE — 80053 COMPREHEN METABOLIC PANEL: CPT

## 2021-09-10 PROCEDURE — 36415 COLL VENOUS BLD VENIPUNCTURE: CPT

## 2021-09-21 ENCOUNTER — OFFICE VISIT (OUTPATIENT)
Dept: HEMATOLOGY/ONCOLOGY | Facility: HOSPITAL | Age: 46
End: 2021-09-21
Attending: INTERNAL MEDICINE
Payer: COMMERCIAL

## 2021-09-21 ENCOUNTER — TELEPHONE (OUTPATIENT)
Dept: FAMILY MEDICINE CLINIC | Facility: CLINIC | Age: 46
End: 2021-09-21

## 2021-09-21 VITALS
OXYGEN SATURATION: 95 % | SYSTOLIC BLOOD PRESSURE: 121 MMHG | HEART RATE: 83 BPM | WEIGHT: 182 LBS | BODY MASS INDEX: 24.65 KG/M2 | RESPIRATION RATE: 16 BRPM | TEMPERATURE: 98 F | DIASTOLIC BLOOD PRESSURE: 68 MMHG | HEIGHT: 72 IN

## 2021-09-21 DIAGNOSIS — D72.819 LEUKOPENIA, UNSPECIFIED TYPE: Primary | ICD-10-CM

## 2021-09-21 DIAGNOSIS — D75.89 MACROCYTOSIS: ICD-10-CM

## 2021-09-21 PROCEDURE — 99244 OFF/OP CNSLTJ NEW/EST MOD 40: CPT | Performed by: INTERNAL MEDICINE

## 2021-09-21 NOTE — TELEPHONE ENCOUNTER
Patient advised of result notes, no other questions at this time. Ama Sim MD   9/11/2021  8:32 AM CDT  Labs and testings unremarkable: Patient contacted and voice message left and notified through [facility]. Encouraged good health habits.  To call

## 2021-09-22 NOTE — CONSULTS
[facility]    PATIENT'S NAME: [facility]   CONSULTING PHYSICIAN: [address]  Justin Khan MD   PATIENT ACCOUNT #: [de-identified] LOCATION: [address] [place] [address] RECORD #: [id number] YOB: 1975   CONSULTATION DATE: 09/21/2021       CAN mother or father. REVIEW OF SYSTEMS:  All other symptoms reviewed and negative x12. PHYSICAL EXAMINATION:    GENERAL:  In no acute distress, alert and oriented. VITAL SIGNS:  ECOG performance status 0. Weight 82 kg.   Blood pressure is 121/68, p

## 2021-11-01 ENCOUNTER — NURSE ONLY (OUTPATIENT)
Dept: GASTROENTEROLOGY | Facility: CLINIC | Age: 46
End: 2021-11-01

## 2021-11-01 NOTE — PROGRESS NOTES
Called patient to explain he does not qualify for a telephone colon screening. NOT CURRENTLY ACTIVE ON [employer]. Tulio    Phone room:     If patient returns the call please sign him up with [employer] and send the GI telephone colon screening questionnaires. Thank you!

## 2021-11-02 ENCOUNTER — TELEPHONE (OUTPATIENT)
Dept: GASTROENTEROLOGY | Facility: CLINIC | Age: 46
End: 2021-11-02

## 2021-11-02 DIAGNOSIS — Z80.0 FAMILY HISTORY OF COLON CANCER: Primary | ICD-10-CM

## 2021-11-02 DIAGNOSIS — K62.5 RECTAL BLEEDING: ICD-10-CM

## 2021-11-02 NOTE — TELEPHONE ENCOUNTER
I would have no objection to proceeding with a colonoscopy sooner for a family history of colon cancer and rectal bleeding. Split dose Suprep would be fine and monitored anesthesia care. I do not band hemorrhoids. I can ask if one of my partners does so, however, banding is typically performed by general surgery.

## 2021-11-02 NOTE — TELEPHONE ENCOUNTER
Pt was on the schedule yesterday for a TCS. He is not due for a colonoscopy until 07/2022    If he is not having any symptoms we can send a message to pt outreach and call him in March to schedule for July.     If he is having symptoms we can do a recall for now

## 2021-11-02 NOTE — TELEPHONE ENCOUNTER
Last Procedure, Date, MD:  07/11/2017  Last Diagnosis:  Tubular adenoma  Recalled for (mth/yrs): 5 years  Sedation used previously:  MAC  Last Prep Used (if known):  Suprep  Quality of prep (if known): good  Anticoagulants: no  Diabetic Meds: no  BP meds(Ace inhibitors/ARB's):no  Weight loss meds (phentermine/vyvanse):no  Iron supplement (RX/OTC): no  Height & Weight/BMI: 6'/182/24.68  Hx of Cardiac/CVA issues/(MI/Stroke): no  Devices Pacemaker/Defibrillator/Stents:no  Resp. Issues/Oxygen Use/STEPHANIE/COPD:no  Issues w/Anesthesia: no    Symptoms (Y/N):   Symptoms Details:     Special comments/notes: medications allergies and pharmacy reviewed with the pt    Please advise on orders and prep, thank you.

## 2021-11-02 NOTE — TELEPHONE ENCOUNTER
Dr Saulo Paz,    I spoke to the pt over the phone    Due to his extensive family hx of colon cancer and his hx of hemorrhoids he would like to have his colonoscopy done a little earlier in 2022 than waiting until mid year of 2022. He is due in July    He had a hemorrhoidectomy about 14 years ago.     He still has issues with his hemorrhoids constantly bleeding and is wondering if he can have banding done with the colonoscopy in 2022

## 2021-11-05 NOTE — TELEPHONE ENCOUNTER
I called back and spoke with the patient. He is inquiring if it would be possible to to undergo a hemorrhoidectomy & colonoscopy at once. I informed her that this would not be possible. He would like to proceed in scheduling his colonoscopy (non-urgently.)    Schedulers-    Please assist in scheduling the patient for a colonoscopy per Dr. Bryan Painter' orders below. Thank you! \"I would have no objection to proceeding with a colonoscopy sooner for a family history of colon cancer and rectal bleeding. Split dose Suprep would be fine and monitored anesthesia care. \"

## 2021-12-06 RX ORDER — GABAPENTIN 100 MG/1
CAPSULE ORAL
Qty: 30 CAPSULE | Refills: 0 | OUTPATIENT
Start: 2021-12-06

## 2021-12-06 RX ORDER — MONTELUKAST SODIUM 10 MG/1
TABLET ORAL
Qty: 90 TABLET | Refills: 1 | Status: SHIPPED | OUTPATIENT
Start: 2021-12-06

## 2021-12-06 RX ORDER — LORATADINE 10 MG/1
TABLET ORAL
Qty: 90 TABLET | Refills: 1 | Status: SHIPPED | OUTPATIENT
Start: 2021-12-06

## 2021-12-06 NOTE — TELEPHONE ENCOUNTER
Message noted: Chart reviewed and may refill medications times one 90 day supply as requested with 1 additional refill. Prescription sent to listed pharmacy. Pharmacy to notify patient.  Pt notified through [facility]

## 2021-12-13 RX ORDER — METHSCOPOLAMINE BROMIDE 2.5 MG/1
TABLET ORAL
Qty: 180 TABLET | Refills: 1 | Status: SHIPPED | OUTPATIENT
Start: 2021-12-13

## 2021-12-13 NOTE — TELEPHONE ENCOUNTER
Message noted: Chart reviewed and may refill medication times one 90 day supply as requested with 1 additional refill. Prescription sent to listed pharmacy. Pharmacy to notify patient.  Pt notified through [facility]

## 2022-02-16 NOTE — TELEPHONE ENCOUNTER
I spoke with the patient and he is now scheduled. Scheduled for:  Colonoscopy 22608   Provider Name:  Dr Jerome Callejas  Date:  Tuesday, 07/12/2022  Location:  [facility]  Sedation:  MAC  Time:  8:15am ( pt is aware arrival time is at 7:15am)  Prep:  Suprep  Meds/Allergies Reconciled?: Yes  Diagnosis with codes:  Family Hx of Colon Cancer Z80.0; Rectal Bleeding K62.5  Was patient informed to call insurance with codes (Y/N):  Yes  Referral sent?:  Referral was sent at the time of electronic surgical scheduling. [facility] or [address] notified?: I sent an electronic request to Endo Scheduling and received a confirmation today. Medication Orders:  Pt is aware to NOT take iron pills, herbal meds and diet supplements for 7 days before exam. Also to NOT take any form of alcohol, recreational drugs and any forms of ED meds 24 hours before exam.   Misc Orders:  Patient was informed that they will need a COVID 19 test prior to their procedure. Patient verbally understood & will await a phone call from [facility] to schedule. Further instructions given by staff:  I provide prep instructions to patient via Mychart and mail and reviewed date, time and location, he verbalized that he understood and is aware to call if he has any questions.

## 2022-02-16 NOTE — TELEPHONE ENCOUNTER
Dr. Syeda Canas,    Can you please send Suprep to the patient's preferred pharmacy? [id number] - [address]    Patient stated that he didn't receive an prescription for it. He is now scheduled for his Colonoscopy on Tuesday, 07/12/2022. Thank you!

## 2022-02-17 RX ORDER — SODIUM, POTASSIUM,MAG SULFATES 17.5-3.13G
SOLUTION, RECONSTITUTED, ORAL ORAL
Qty: 1 EACH | Refills: 0 | Status: SHIPPED | OUTPATIENT
Start: 2022-02-17 | End: 2022-07-12

## 2022-05-23 ENCOUNTER — PATIENT MESSAGE (OUTPATIENT)
Dept: FAMILY MEDICINE CLINIC | Facility: CLINIC | Age: 47
End: 2022-05-23

## 2022-05-23 NOTE — TELEPHONE ENCOUNTER
Awaiting response from patient. Gabapentin pending along with chart notes for Dr. Lisbet Rasheed. Please add preferred pharmacy if applicable.

## 2022-05-23 NOTE — TELEPHONE ENCOUNTER
From: Myranda Blevins  To: Pat Davis MD  Sent: 5/23/2022 10:21 AM CDT  Subject: Restless legs    Hello Dr Mary Alice Moss,  I have always had restless legs and arms, but lately it is much more frequent and consistently waking me up. A year or so ago, you subscribed me some pills that were for the nerves and they helped. I wanted to see if you could refill or reorder that medication to try again.   Thanks,  [facility] Referrals

## 2022-05-25 RX ORDER — GABAPENTIN 100 MG/1
100 CAPSULE ORAL NIGHTLY
Qty: 90 CAPSULE | Refills: 1 | Status: SHIPPED | OUTPATIENT
Start: 2022-05-25

## 2022-05-25 NOTE — TELEPHONE ENCOUNTER
Message noted: Chart reviewed and may refill medication as requested times one with 1 additional refills. Prescription sent to listed pharmacy. Pharmacy to notify patient.  Pt notified through [facility]

## 2022-06-02 NOTE — TELEPHONE ENCOUNTER
Please review; protocol failed/ no protocol.     Requested Prescriptions   Pending Prescriptions Disp Refills    MONTELUKAST 10 MG Oral Tab [Pharmacy Med Name: MONTELUKAST 10MG TABLETS] 90 tablet 1     Sig: TAKE 1 TABLET BY MOUTH EVERY NIGHT        Asthma & COPD Medication Protocol Failed - 6/1/2022  5:07 PM        Failed - Appointment in past 6 or next 3 months           LORATADINE 10 MG Oral Tab [Pharmacy Med Name: LORATADINE 10MG TABLETS] 90 tablet 1     Sig: TAKE 1 TABLET BY MOUTH DAILY        Allergy Medication Protocol Passed - 6/1/2022  5:07 PM        Passed - Appointment in past 12 or next 3 months              Recent Outpatient Visits              7 months ago     [facility] GI PROCEDURE    Nurse Only    8 months ago Leukopenia, unspecified type    [facility] Hematology Oncology Alexandre Ramirez MD    Office Visit    8 months ago Routine physical examination    Francis Manning MD    Office Visit    1 year ago Restless legs syndrome    Francis Manning MD    [employer] [facility]    2 years ago Routine physical examination    Francsi Manning MD    Office Visit            Future Appointments         Provider Department Appt Notes    In 1 month STATHOPOULOS, PROCEDURE [facility] GI PROCEDURE Colon w/ MAC @ [address] [place] [address]

## 2022-06-04 RX ORDER — MONTELUKAST SODIUM 10 MG/1
TABLET ORAL
Qty: 90 TABLET | Refills: 1 | Status: SHIPPED | OUTPATIENT
Start: 2022-06-04

## 2022-06-04 RX ORDER — LORATADINE 10 MG/1
TABLET ORAL
Qty: 90 TABLET | Refills: 1 | Status: SHIPPED | OUTPATIENT
Start: 2022-06-04

## 2022-06-04 NOTE — TELEPHONE ENCOUNTER
Message noted: Chart reviewed and may refill medication as requested. Prescription sent to listed pharmacy. Pharmacy to notify patient.  Pt notified through [facility]

## 2022-06-14 RX ORDER — METHSCOPOLAMINE BROMIDE 2.5 MG/1
TABLET ORAL
Qty: 180 TABLET | Refills: 0 | OUTPATIENT
Start: 2022-06-14

## 2022-06-14 NOTE — TELEPHONE ENCOUNTER
Pharmacy requested in in [place]. Did patient move? . Please contact pt and if he moved. Should find another provider to fill prescriptions.

## 2022-06-15 ENCOUNTER — PATIENT MESSAGE (OUTPATIENT)
Dept: FAMILY MEDICINE CLINIC | Facility: CLINIC | Age: 47
End: 2022-06-15

## 2022-06-15 RX ORDER — METHSCOPOLAMINE BROMIDE 2.5 MG/1
2.5 TABLET ORAL 2 TIMES DAILY
Qty: 180 TABLET | Refills: 0 | Status: SHIPPED | OUTPATIENT
Start: 2022-06-15

## 2022-06-15 NOTE — TELEPHONE ENCOUNTER
Message noted: Chart reviewed and may refill medication as requested. Prescription sent to listed pharmacy. Pharmacy to notify patient.  Pt notified through [facility][note 2]

## 2022-07-11 ENCOUNTER — TELEPHONE (OUTPATIENT)
Dept: GASTROENTEROLOGY | Facility: CLINIC | Age: 47
End: 2022-07-11

## 2022-07-11 NOTE — TELEPHONE ENCOUNTER
Called and spoke with Dev. He had several questions regarding preparation for tomorrow's procedure. Wanted to clarify the split-dose prep timing, states that he has never had to do this previously. Informed him that this is correct & reviewed the prep instructions that were sent to him via [employer] on 2/16/22. Pt verbalizes understanding of prep/instructions/time of procedure. All questions and concerns were addressed.

## 2022-07-12 ENCOUNTER — ANESTHESIA EVENT (OUTPATIENT)
Dept: ENDOSCOPY | Facility: HOSPITAL | Age: 47
End: 2022-07-12
Payer: COMMERCIAL

## 2022-07-12 ENCOUNTER — HOSPITAL ENCOUNTER (OUTPATIENT)
Facility: HOSPITAL | Age: 47
Setting detail: HOSPITAL OUTPATIENT SURGERY
Discharge: HOME | End: 2022-07-12
Attending: INTERNAL MEDICINE | Admitting: INTERNAL MEDICINE
Payer: COMMERCIAL

## 2022-07-12 ENCOUNTER — ANESTHESIA (OUTPATIENT)
Dept: ENDOSCOPY | Facility: HOSPITAL | Age: 47
End: 2022-07-12
Payer: COMMERCIAL

## 2022-07-12 VITALS
WEIGHT: 185 LBS | TEMPERATURE: 98 F | SYSTOLIC BLOOD PRESSURE: 113 MMHG | OXYGEN SATURATION: 98 % | RESPIRATION RATE: 18 BRPM | DIASTOLIC BLOOD PRESSURE: 82 MMHG | BODY MASS INDEX: 25.06 KG/M2 | HEIGHT: 72 IN | HEART RATE: 59 BPM

## 2022-07-12 DIAGNOSIS — K62.5 RECTAL BLEEDING: ICD-10-CM

## 2022-07-12 DIAGNOSIS — Z80.0 FAMILY HISTORY OF COLON CANCER: ICD-10-CM

## 2022-07-12 PROCEDURE — 0DBH8ZX EXCISION OF CECUM, VIA NATURAL OR ARTIFICIAL OPENING ENDOSCOPIC, DIAGNOSTIC: ICD-10-PCS | Performed by: INTERNAL MEDICINE

## 2022-07-12 PROCEDURE — 45385 COLONOSCOPY W/LESION REMOVAL: CPT | Performed by: INTERNAL MEDICINE

## 2022-07-12 RX ORDER — LIDOCAINE HYDROCHLORIDE 10 MG/ML
INJECTION, SOLUTION EPIDURAL; INFILTRATION; INTRACAUDAL; PERINEURAL AS NEEDED
Status: DISCONTINUED | OUTPATIENT
Start: 2022-07-12 | End: 2022-07-12 | Stop reason: SURG

## 2022-07-12 RX ORDER — PHENOL 1.4 %
AEROSOL, SPRAY (ML) MUCOUS MEMBRANE
COMMUNITY

## 2022-07-12 RX ORDER — SODIUM CHLORIDE, SODIUM LACTATE, POTASSIUM CHLORIDE, CALCIUM CHLORIDE 600; 310; 30; 20 MG/100ML; MG/100ML; MG/100ML; MG/100ML
INJECTION, SOLUTION INTRAVENOUS CONTINUOUS
Status: DISCONTINUED | OUTPATIENT
Start: 2022-07-12 | End: 2022-07-12

## 2022-07-12 RX ADMIN — LIDOCAINE HYDROCHLORIDE 50 MG: 10 INJECTION, SOLUTION EPIDURAL; INFILTRATION; INTRACAUDAL; PERINEURAL at 08:30:00

## 2022-07-12 RX ADMIN — SODIUM CHLORIDE, SODIUM LACTATE, POTASSIUM CHLORIDE, CALCIUM CHLORIDE: 600; 310; 30; 20 INJECTION, SOLUTION INTRAVENOUS at 08:30:00

## 2022-07-12 NOTE — ANESTHESIA POSTPROCEDURE EVALUATION
Patient: Luna Lauren    Procedure Summary     Date: 07/12/22 Room / Location: [address] [place] [address] ENDOSCOPY 04 / [address] [place] [address] ENDOSCOPY    Anesthesia Start: 0341 Anesthesia Stop:     Procedure: COLONOSCOPY (N/A ) Diagnosis:       Family history of colon cancer      Rectal bleeding      (Colon polyp)    Surgeons: Lashay Talley MD Anesthesiologist: Ruddy Reeder CRNA    Anesthesia Type: MAC ASA Status: 2          Anesthesia Type: MAC    Vitals Value Taken Time   /63 07/12/22 0905   Temp 98.6 07/12/22 0905   Pulse 69 07/12/22 0905   Resp 12 07/12/22 0905   SpO2 96 07/12/22 0905       [facility] AN Post Evaluation:   Patient Evaluated in PACU  Patient Participation: complete - patient participated  Level of Consciousness: awake and alert  Pain Score: 0  Pain Management: adequate  Airway Patency:patent  Dental exam unchanged from preop  Yes    Cardiovascular Status: acceptable  Respiratory Status: acceptable  Postoperative Hydration acceptable      [address], CRNA  7/12/2022 9:05 AM

## 2022-07-12 NOTE — OPERATIVE REPORT
[facility] Endoscopy Report      Date of Procedure:  07/12/22      Preoperative Diagnosis:  1. Colorectal cancer screening  2. Family history of colon cancer  3. Personal history of adenomatous colon polyp      Postoperative Diagnosis:  Diminutive cecal polyp      Procedure:    Colonoscopy with polypectomy      Surgeon:  Sameera Durbin M.D. Anesthesia:  Monitored anesthesia care  Cecal withdrawal time: 19 minutes  EBL:  Insignificant      Brief History: This is a 40year old male who presents for a screening/surveillance colonoscopy in the setting of a significant family history of colon cancer in his father (age 48), paternal grandmother, maternal aunts and multiple cousins. The patient's last colonoscopy was 5 years prior with removal of a solitary subcentimeter tubular adenoma. He has been asymptomatic from a lower gastrointestinal tract standpoint. Technique:  After informed consent, the patient was placed in the left lateral recumbent position. Digital rectal examination revealed no palpable intraluminal abnormalities. An Olympus variable stiffness 190 series HD colonoscope was inserted into the rectum and advanced under direct vision by following the lumen to the terminal ileum. The colon was examined upon withdrawal in the left lateral recumbent position. Findings:  The preparation of the colon was reasonably good, however, some seed debris was present. The terminal ileum was examined for 5 cm and visually normal.  The ileocecal valve was well preserved. The visualized colonic mucosa from the cecum to the anal verge was normal with an intact vascular pattern. In the cecum there was a 3 mm sessile polyp which was cold snare excised and retrieved. No ongoing bleeding. There were no other colonic polyps, mass lesions, vascular anomalies or signs of inflammation seen. Retroflexion in the rectum revealed incidental internal hemorrhoids.   The procedure was well tolerated without immediate complication. Impression:  1. Diminutive cecal polyp  2. Otherwise normal colonoscopy to the terminal ileum    Recommendations:  1. Follow-up biopsy results. 2.  Probable screening colonoscopy in 5 years based on family history.         Marito Guerra MD  7/12/2022

## 2022-07-14 ENCOUNTER — TELEPHONE (OUTPATIENT)
Dept: GASTROENTEROLOGY | Facility: CLINIC | Age: 47
End: 2022-07-14

## 2022-07-14 NOTE — TELEPHONE ENCOUNTER
----- Message from Anette Sung MD sent at 7/13/2022  7:23 PM CDT -----  AshokI left a message on your voicemail. Your colonoscopy revealed #1 small polyp which was removed. This polyp was a tubular adenoma. This is the type of polyp that has a potential to become a tumor or cancer, however, at this stage was small, benign and completely removed. Most polyps of this size and type do not progress to cancer. You have had similar polyps removed in the past.Based on the history of polyps and your family history, I would recommend a follow-up colonoscopy in 5 years. If I can answer any questions regarding the above, please do not hesitate to contact me. Sincerely,Dr. Sunshine Ruby RNs: Please enter colonoscopy recall for 5 years.

## 2022-07-14 NOTE — TELEPHONE ENCOUNTER
Health Maintenance Updated. 5 year colonoscopy recall entered into patient outreach in [address] [place] [address] Rd. Next colonoscopy will be due 7/12/2027.

## 2022-09-02 RX ORDER — METHSCOPOLAMINE BROMIDE 2.5 MG/1
2.5 TABLET ORAL 2 TIMES DAILY
Qty: 180 TABLET | Refills: 0 | Status: SHIPPED | OUTPATIENT
Start: 2022-09-02

## 2022-09-02 NOTE — TELEPHONE ENCOUNTER
Refill passed per [address] [place] protocol.   Requested Prescriptions   Pending Prescriptions Disp Refills    METHSCOPOLAMINE 2.5 MG Oral Tab [Pharmacy Med Name: METHSCOPOLAMINE BR 2.5MG TABLETS] 180 tablet 0     Sig: TAKE 1 TABLET(2.5 MG) BY MOUTH TWICE DAILY        Gastrointestional Medication Protocol Passed - 9/1/2022  8:06 AM        Passed - In person appointment or virtual visit in the past 12 mos or appointment in next 3 mos       Recent Outpatient Visits              10 months ago     [facility] GI PROCEDURE    Nurse Only    11 months ago Leukopenia, unspecified type    [facility] Hematology Oncology Sandra Acevedo MD    Office Visit    11 months ago Routine physical examination    Sameera Pierce MD    Office Visit    1 year ago Restless legs syndrome    Sameera Pierce MD    [employer] [facility]    3 years ago Routine physical examination    Sameera Pierce MD    Office Visit                     Recent Outpatient Visits              10 months ago     [facility] GI PROCEDURE    Nurse Only    11 months ago Leukopenia, unspecified type    [facility] Hematology Oncology Sandra Acevedo MD    Office Visit    11 months ago Routine physical examination    Sameera Pierce MD    Office Visit    1 year ago Restless legs syndrome    Sameera Pierce MD    [employer] E/M    3 years ago Routine physical examination    Sameera Pierce MD    Office Visit

## 2022-10-20 ENCOUNTER — APPOINTMENT (OUTPATIENT)
Dept: URBAN - METROPOLITAN AREA CLINIC 244 | Age: 47
Setting detail: DERMATOLOGY
End: 2022-10-21

## 2022-10-20 ENCOUNTER — APPOINTMENT (OUTPATIENT)
Dept: URBAN - METROPOLITAN AREA CLINIC 244 | Age: 47
Setting detail: DERMATOLOGY
End: 2022-10-20

## 2022-10-20 DIAGNOSIS — D22 MELANOCYTIC NEVI: ICD-10-CM

## 2022-10-20 PROBLEM — D22.39 MELANOCYTIC NEVI OF OTHER PARTS OF FACE: Status: ACTIVE | Noted: 2022-10-20

## 2022-10-20 PROCEDURE — 11310 SHAVE SKIN LESION 0.5 CM/<: CPT

## 2022-10-20 PROCEDURE — OTHER SHAVE REMOVAL: OTHER

## 2022-10-20 ASSESSMENT — LOCATION SIMPLE DESCRIPTION DERM: LOCATION SIMPLE: RIGHT NOSE

## 2022-10-20 ASSESSMENT — LOCATION DETAILED DESCRIPTION DERM: LOCATION DETAILED: RIGHT NASAL SIDEWALL

## 2022-10-20 ASSESSMENT — LOCATION ZONE DERM: LOCATION ZONE: NOSE

## 2022-10-20 NOTE — PROCEDURE: SHAVE REMOVAL
Bill For Surgical Tray: no
Size Of Lesion In Cm (Required): 0.3
Was A Bandage Applied: Yes
Post-Care Instructions: I reviewed with the patient in detail post-care instructions. Patient is to keep the biopsy site dry overnight, and then apply petrolatum twice daily until healed or after one or two night patient may leave area open and dry and a scab will form which will eventually fall off in a few weeks without further care other than cleaning twice a day.
Consent was obtained from the patient. The risks and benefits to therapy were discussed in detail. Specifically, the risks of infection, scarring, bleeding, prolonged wound healing, incomplete removal, allergy to anesthesia, nerve injury and recurrence were addressed.
Billing Type: Third-Party Bill
Notification Instructions: Patient will be notified of pathology results. However, patient instructed to call the office if not contacted within 2 weeks.
Medical Necessity Information: It is in your best interest to select a reason for this procedure from the list below. All of these items fulfill various CMS LCD requirements except the new and changing color options.
X Size Of Lesion In Cm (Optional): 0
Hemostasis: Aluminum Chloride
Anesthesia Type: 1% lidocaine with epinephrine and a 1:10 solution of 8.4% sodium bicarbonate
Anesthesia Volume In Cc: 3
Biopsy Method: double edge Personna blade
Detail Level: Detailed
Medical Necessity Clause: This procedure was medically necessary because the lesion that was treated was:
Wound Care: Petrolatum

## 2022-10-20 NOTE — HPI: EVALUATION OF SKIN LESION(S)
What Type Of Note Output Would You Prefer (Optional)?: Bullet Format
How Severe Are Your Spot(S)?: mild
Hpi Title: Evaluation of a Skin Lesion
Additional History: Applied neosporin

## 2022-11-07 ENCOUNTER — OFFICE VISIT (OUTPATIENT)
Dept: FAMILY MEDICINE CLINIC | Facility: CLINIC | Age: 47
End: 2022-11-07
Payer: COMMERCIAL

## 2022-11-07 VITALS
WEIGHT: 185 LBS | SYSTOLIC BLOOD PRESSURE: 126 MMHG | BODY MASS INDEX: 25.06 KG/M2 | HEART RATE: 90 BPM | HEIGHT: 72 IN | TEMPERATURE: 98 F | DIASTOLIC BLOOD PRESSURE: 84 MMHG

## 2022-11-07 DIAGNOSIS — J06.9 ACUTE URI: Primary | ICD-10-CM

## 2022-11-07 PROCEDURE — 3008F BODY MASS INDEX DOCD: CPT | Performed by: FAMILY MEDICINE

## 2022-11-07 PROCEDURE — 3079F DIAST BP 80-89 MM HG: CPT | Performed by: FAMILY MEDICINE

## 2022-11-07 PROCEDURE — 99213 OFFICE O/P EST LOW 20 MIN: CPT | Performed by: FAMILY MEDICINE

## 2022-11-07 PROCEDURE — 3074F SYST BP LT 130 MM HG: CPT | Performed by: FAMILY MEDICINE

## 2022-12-02 ENCOUNTER — APPOINTMENT (OUTPATIENT)
Dept: URBAN - METROPOLITAN AREA CLINIC 244 | Age: 47
Setting detail: DERMATOLOGY
End: 2022-12-02

## 2022-12-02 DIAGNOSIS — D22 MELANOCYTIC NEVI: ICD-10-CM

## 2022-12-02 DIAGNOSIS — L81.4 OTHER MELANIN HYPERPIGMENTATION: ICD-10-CM

## 2022-12-02 DIAGNOSIS — L82.1 OTHER SEBORRHEIC KERATOSIS: ICD-10-CM

## 2022-12-02 PROBLEM — D22.5 MELANOCYTIC NEVI OF TRUNK: Status: ACTIVE | Noted: 2022-12-02

## 2022-12-02 PROCEDURE — OTHER SHAVE REMOVAL: OTHER

## 2022-12-02 PROCEDURE — OTHER COUNSELING: OTHER

## 2022-12-02 PROCEDURE — 11302 SHAVE SKIN LESION 1.1-2.0 CM: CPT

## 2022-12-02 PROCEDURE — 99213 OFFICE O/P EST LOW 20 MIN: CPT | Mod: 25

## 2022-12-02 ASSESSMENT — LOCATION DETAILED DESCRIPTION DERM
LOCATION DETAILED: LEFT MEDIAL UPPER BACK
LOCATION DETAILED: RIGHT SUPERIOR MEDIAL UPPER BACK
LOCATION DETAILED: LEFT SUPERIOR MEDIAL UPPER BACK
LOCATION DETAILED: UPPER STERNUM

## 2022-12-02 ASSESSMENT — LOCATION SIMPLE DESCRIPTION DERM
LOCATION SIMPLE: CHEST
LOCATION SIMPLE: LEFT UPPER BACK
LOCATION SIMPLE: RIGHT UPPER BACK

## 2022-12-02 ASSESSMENT — LOCATION ZONE DERM: LOCATION ZONE: TRUNK

## 2022-12-02 NOTE — PROCEDURE: SHAVE REMOVAL
Notification Instructions: Patient will be notified of pathology results. However, patient instructed to call the office if not contacted within 2 weeks.
Size Of Lesion In Cm (Required): 1.2
Add Variable For Additional Medical Justification: No
Consent was obtained from the patient. The risks and benefits to therapy were discussed in detail. Specifically, the risks of infection, scarring, bleeding, prolonged wound healing, incomplete removal, allergy to anesthesia, nerve injury and recurrence were addressed.
Hide Shave Removal Depth?: Yes
Medical Necessity Clause: This procedure was medically necessary because the lesion that was treated was:
Depth Of Shave: dermis
Wound Care: Petrolatum
Post-Care Instructions: I reviewed with the patient in detail post-care instructions. Patient is to keep the biopsy site dry overnight, and then apply petrolatum twice daily until healed or after one or two night patient may leave area open and dry and a scab will form which will eventually fall off in a few weeks without further care other than cleaning twice a day.
Detail Level: Detailed
Billing Type: Third-Party Bill
Anesthesia Type: 0.5% lidocaine with 1:200,000 epinephrine and a 1:10 solution of 8.4% sodium bicarbonate
X Size Of Lesion In Cm (Optional): 0
Medical Necessity Information: It is in your best interest to select a reason for this procedure from the list below. All of these items fulfill various CMS LCD requirements except the new and changing color options.
Biopsy Method: double edge Personna blade
Hemostasis: Drysol

## 2022-12-06 RX ORDER — MONTELUKAST SODIUM 10 MG/1
TABLET ORAL
Qty: 90 TABLET | Refills: 1 | Status: SHIPPED | OUTPATIENT
Start: 2022-12-06

## 2022-12-06 RX ORDER — LORATADINE 10 MG/1
TABLET ORAL
Qty: 90 TABLET | Refills: 1 | Status: SHIPPED | OUTPATIENT
Start: 2022-12-06

## 2022-12-06 RX ORDER — GABAPENTIN 100 MG/1
CAPSULE ORAL
Qty: 90 CAPSULE | Refills: 1 | Status: SHIPPED | OUTPATIENT
Start: 2022-12-06

## 2022-12-06 RX ORDER — METHSCOPOLAMINE BROMIDE 2.5 MG/1
2.5 TABLET ORAL 2 TIMES DAILY
Qty: 180 TABLET | Refills: 1 | Status: SHIPPED | OUTPATIENT
Start: 2022-12-06

## 2022-12-06 NOTE — TELEPHONE ENCOUNTER
[facility]    Requested Prescriptions   Pending Prescriptions Disp Refills    MONTELUKAST 10 MG Oral Tab [Pharmacy Med Name: MONTELUKAST 10MG TABLETS] 90 tablet 1     Sig: TAKE 1 TABLET BY MOUTH EVERY NIGHT       Asthma & COPD Medication Protocol Passed - 12/5/2022  2:16 PM        Passed - In person appointment or virtual visit in the past 6 mos or appointment in next 3 mos     Recent Outpatient Visits              4 weeks ago [facility], [address], [place]    Office Visit    1 year ago     [facility] GI PROCEDURE    Nurse Only    1 year ago Leukopenia, unspecified type    [facility] Hematology Oncology Geoff Rawls MD    Office Visit    1 year ago Routine physical examination    Rosario Echeverria MD    Office Visit    2 years ago Restless legs syndrome    Rosario Echeverria MD    [employer] E/M                        GABAPENTIN 100 MG Oral Cap [place] Med Name: GABAPENTIN 100MG CAPSULES] 90 capsule 1     Sig: TAKE 1 CAPSULE(100 MG) BY MOUTH EVERY NIGHT       Neurology Medications Passed - 12/5/2022  2:16 PM        Passed - In person appointment or virtual visit in the past 6 mos or appointment in next 3 mos     Recent Outpatient Visits              4 weeks ago [facility], [address], [place]    Office Visit    1 year ago     [facility] GI PROCEDURE    Nurse Only    1 year ago Leukopenia, unspecified type    [facility] Hematology Oncology Geoff Rawls MD    Office Visit    1 year ago Routine physical examination    Rosario Echeverria MD    Office Visit    2 years ago Restless legs syndrome    Rosario Echeverria MD    [employer] E/M                        LORATADINE 10 MG Oral Tab [Pharmacy Med Name: LORATADINE 10MG TABLETS] 90 tablet 1     Sig: TAKE 1 TABLET BY MOUTH DAILY       Allergy Medication Protocol Passed - 12/5/2022  2:16 PM        Passed - In person appointment or virtual visit in the past 12 mos or appointment in next 3 mos     Recent Outpatient Visits              4 weeks ago [facility], [address] [place] [address]    Office Visit    1 year ago     [facility] GI PROCEDURE    Nurse Only    1 year ago Leukopenia, unspecified type    [facility] Hematology Oncology Benedict Neil MD    Office Visit    1 year ago Routine physical examination    Guy Kumar MD    Office Visit    2 years ago Restless legs syndrome    Guy Kumar MD    Virtual Phone E/M                        METHSCOPOLAMINE 2.5 MG Oral Tab [place] Med Name: METHSCOPOLAMINE BR 2.5MG TABLETS] 180 tablet 0     Sig: TAKE 1 TABLET(2.5 MG) BY MOUTH TWICE DAILY       Gastrointestional Medication Protocol Passed - 12/5/2022  2:16 PM        Passed - In person appointment or virtual visit in the past 12 mos or appointment in next 3 mos     Recent Outpatient Visits              4 weeks ago [facility], [address] [place] [address]    Office Visit    1 year ago     [facility] GI PROCEDURE    Nurse Only    1 year ago Leukopenia, unspecified type    [facility] Hematology Oncology Benedict Neil MD    Office Visit    1 year ago Routine physical examination    Guy Kumar MD    Office Visit    2 years ago Restless legs syndrome    Guy Kumar MD    [employer] [facility]                          Recent Outpatient Visits              4 weeks ago [facility], [address] [place] [address]    Office Visit    1 year ago     [facility] GI PROCEDURE    Nurse Only    1 year ago Leukopenia, unspecified type    [facility] Hematology Oncology Ania Mensah MD    Office Visit    1 year ago Routine physical examination    Tenzin Rawls MD    Office Visit    2 years ago Restless legs syndrome    Tenzin Rawls MD    [facility] [employer] [facility]

## 2023-02-28 ENCOUNTER — LAB ENCOUNTER (OUTPATIENT)
Dept: LAB | Facility: HOSPITAL | Age: 48
End: 2023-02-28
Attending: Other
Payer: COMMERCIAL

## 2023-02-28 ENCOUNTER — OFFICE VISIT (OUTPATIENT)
Dept: NEUROLOGY | Facility: CLINIC | Age: 48
End: 2023-02-28
Payer: COMMERCIAL

## 2023-02-28 VITALS — BODY MASS INDEX: 24.52 KG/M2 | WEIGHT: 185 LBS | HEIGHT: 73 IN

## 2023-02-28 DIAGNOSIS — G25.81 RESTLESS LEGS SYNDROME: Primary | ICD-10-CM

## 2023-02-28 DIAGNOSIS — G25.81 RESTLESS LEGS SYNDROME: ICD-10-CM

## 2023-02-28 DIAGNOSIS — M62.838 MUSCLE SPASM: ICD-10-CM

## 2023-02-28 LAB
ANION GAP SERPL CALC-SCNC: 4 MMOL/L (ref 0–18)
BUN BLD-MCNC: 16 MG/DL (ref 7–18)
BUN/CREAT SERPL: 18 (ref 10–20)
CALCIUM BLD-MCNC: 8.9 MG/DL (ref 8.5–10.1)
CHLORIDE SERPL-SCNC: 108 MMOL/L (ref 98–112)
CO2 SERPL-SCNC: 27 MMOL/L (ref 21–32)
CREAT BLD-MCNC: 0.89 MG/DL
DEPRECATED HBV CORE AB SER IA-ACNC: 86.6 NG/ML
FASTING STATUS PATIENT QL REPORTED: NO
GFR SERPLBLD BASED ON 1.73 SQ M-ARVRAT: 107 ML/MIN/1.73M2 (ref 60–?)
GLUCOSE BLD-MCNC: 105 MG/DL (ref 70–99)
MAGNESIUM SERPL-MCNC: 2 MG/DL (ref 1.6–2.6)
OSMOLALITY SERPL CALC.SUM OF ELEC: 290 MOSM/KG (ref 275–295)
POTASSIUM SERPL-SCNC: 3.9 MMOL/L (ref 3.5–5.1)
SODIUM SERPL-SCNC: 139 MMOL/L (ref 136–145)

## 2023-02-28 PROCEDURE — 3008F BODY MASS INDEX DOCD: CPT | Performed by: OTHER

## 2023-02-28 PROCEDURE — 82728 ASSAY OF FERRITIN: CPT

## 2023-02-28 PROCEDURE — 36415 COLL VENOUS BLD VENIPUNCTURE: CPT

## 2023-02-28 PROCEDURE — 80048 BASIC METABOLIC PNL TOTAL CA: CPT

## 2023-02-28 PROCEDURE — 99204 OFFICE O/P NEW MOD 45 MIN: CPT | Performed by: OTHER

## 2023-02-28 PROCEDURE — 83735 ASSAY OF MAGNESIUM: CPT

## 2023-02-28 RX ORDER — GABAPENTIN 300 MG/1
300 CAPSULE ORAL NIGHTLY
Qty: 90 CAPSULE | Refills: 0 | Status: SHIPPED | OUTPATIENT
Start: 2023-02-28 | End: 2023-05-29

## 2023-02-28 NOTE — PATIENT INSTRUCTIONS
-Check ferritin, magnesium   -Increase Gabapentin 300 mg at bedtime   -Magnesium oxide 400 mg at bedtime     -Follow up in 3 months or sooner if needed. -If you develop sudden onset loss of vision, double vision, room spinning/world spinning sensation, inability to speak or understand others who are speaking to you, slurred speech, balance problems, weakness on one side of your body, numbness on one side of your body or worst headache you ever had, please seek out emergency medical attention via 911 for the nearest emergency room to be evaluated for possible stroke. -MyChart or call office with any questions or concerns. Thank you for coming to see me today. The easiest way to communicate with me is via [employer] is meant for simple questions regarding medications, possible side effects, or other simple straight forward questions in limited sentences, rather than multiple paragraphs of discussion. [employer] is not meant for, or efficient for these complex questions, extensive questions, extensive medication adjustments, complex new symptoms or concerns. These issues beyond simple questions require a follow up visit with myself. Refills:  Please pay attention to when your refills will need to be renewed. Due to the volume of phone calls daily, this could potentially take a few days, although we certainly try to honor your refill requests as soon as we can. You should call at least 1 week in advance of needing a refill to ensure you do not run out of medication. Keep in mind that refill requests on Fridays may not be filled until the following week.

## 2023-03-30 ENCOUNTER — TELEPHONE (OUTPATIENT)
Dept: NEUROLOGY | Facility: CLINIC | Age: 48
End: 2023-03-30

## 2023-05-26 ENCOUNTER — PATIENT MESSAGE (OUTPATIENT)
Dept: NEUROLOGY | Facility: CLINIC | Age: 48
End: 2023-05-26

## 2023-05-26 NOTE — TELEPHONE ENCOUNTER
From: Kareem Suarez [facility]  To: Kamilahee Nyhan,   Sent: 5/26/2023 8:43 AM CDT  Subject: Continued leg spasms    Hi Dr Vanessa Adams,  I am scheduled to meet with you next Tues May 30 as a follow up. When you first \"upped\" the level of my pills they worked really well. For months I didn't have restless leg spasms at night, but then it seemed to slowly trickle back even though I remained taking the pills. In the past two weeks, especially this past week, it is back to waking me up about an hour after falling asleep. I stretch them and massage them, but they aren't getting better and it is becoming a problem again. I wanted to see if you thought it would make sense to up the prescription in advance of my appointment next Tues to see if it changes by then.

## 2023-05-30 RX ORDER — GABAPENTIN 600 MG/1
600 TABLET ORAL NIGHTLY
Qty: 30 TABLET | Refills: 0 | Status: SHIPPED | OUTPATIENT
Start: 2023-05-30 | End: 2023-06-05

## 2023-06-01 RX ORDER — LORATADINE 10 MG/1
TABLET ORAL
Qty: 90 TABLET | Refills: 0 | Status: SHIPPED | OUTPATIENT
Start: 2023-06-01

## 2023-06-01 RX ORDER — MONTELUKAST SODIUM 10 MG/1
TABLET ORAL
Qty: 90 TABLET | Refills: 0 | Status: SHIPPED | OUTPATIENT
Start: 2023-06-01

## 2023-06-01 RX ORDER — METHSCOPOLAMINE BROMIDE 2.5 MG/1
TABLET ORAL
Qty: 180 TABLET | Refills: 0 | Status: SHIPPED | OUTPATIENT
Start: 2023-06-01

## 2023-06-01 NOTE — TELEPHONE ENCOUNTER
Message noted: Chart reviewed and may refill medication as requested times one. Prescription sent to listed pharmacy. Pharmacy to notify patient to make appointment for further refills  Pt notified through [facility] also.

## 2023-06-05 ENCOUNTER — OFFICE VISIT (OUTPATIENT)
Dept: NEUROLOGY | Facility: CLINIC | Age: 48
End: 2023-06-05
Payer: COMMERCIAL

## 2023-06-05 VITALS
BODY MASS INDEX: 25.73 KG/M2 | DIASTOLIC BLOOD PRESSURE: 84 MMHG | HEART RATE: 70 BPM | SYSTOLIC BLOOD PRESSURE: 134 MMHG | HEIGHT: 72 IN | WEIGHT: 190 LBS

## 2023-06-05 DIAGNOSIS — G25.81 RESTLESS LEGS SYNDROME: Primary | ICD-10-CM

## 2023-06-05 PROCEDURE — 99213 OFFICE O/P EST LOW 20 MIN: CPT | Performed by: OTHER

## 2023-06-05 PROCEDURE — 3079F DIAST BP 80-89 MM HG: CPT | Performed by: OTHER

## 2023-06-05 PROCEDURE — 3008F BODY MASS INDEX DOCD: CPT | Performed by: OTHER

## 2023-06-05 PROCEDURE — 3075F SYST BP GE 130 - 139MM HG: CPT | Performed by: OTHER

## 2023-06-05 RX ORDER — GABAPENTIN 600 MG/1
600 TABLET ORAL NIGHTLY
Qty: 90 TABLET | Refills: 5 | Status: SHIPPED | OUTPATIENT
Start: 2023-06-05 | End: 2023-09-03

## 2023-06-05 NOTE — PATIENT INSTRUCTIONS
-Follow up in 6 months or sooner if needed. -If you develop sudden onset loss of vision, double vision, room spinning/world spinning sensation, inability to speak or understand others who are speaking to you, slurred speech, balance problems, weakness on one side of your body, numbness on one side of your body or worst headache you ever had, please seek out emergency medical attention via 911 for the nearest emergency room to be evaluated for possible stroke. -MyChart or call office with any questions or concerns. Thank you for coming to see me today. The easiest way to communicate with me is via [employer]. [employer] is meant for simple questions regarding medications, possible side effects, or other simple straight forward questions in limited sentences, rather than multiple paragraphs of discussion. [employer] is not meant for, or efficient for these complex questions, extensive questions, extensive medication adjustments, complex new symptoms or concerns. These issues beyond simple questions require a follow up visit with myself. Refills:  Please pay attention to when your refills will need to be renewed. Due to the volume of phone calls daily, this could potentially take a few days, although we certainly try to honor your refill requests as soon as we can. You should call at least 1 week in advance of needing a refill to ensure you do not run out of medication. Keep in mind that refill requests on Fridays may not be filled until the following week.

## 2023-07-06 RX ORDER — GABAPENTIN 600 MG/1
600 TABLET ORAL NIGHTLY
Qty: 90 TABLET | Refills: 0 | Status: SHIPPED
Start: 2023-07-06 | End: 2023-07-06

## 2023-07-06 RX ORDER — GABAPENTIN 600 MG/1
600 TABLET ORAL NIGHTLY
Qty: 90 TABLET | Refills: 0 | Status: SHIPPED
Start: 2023-07-06 | End: 2023-10-04

## 2023-07-06 RX ORDER — GABAPENTIN 600 MG/1
600 TABLET ORAL NIGHTLY
Qty: 90 TABLET | Refills: 5 | Status: CANCELLED | OUTPATIENT
Start: 2023-07-06 | End: 2023-10-04

## 2023-07-06 NOTE — TELEPHONE ENCOUNTER
Spoke to patient.  Advised refills available at   [address], [address] [facility] AT [address] [place] [address], [phone], [phone]   Pt to have pharmacy transfer prescription

## 2023-07-06 NOTE — TELEPHONE ENCOUNTER
Rx has been faxed to [place] in [address] [facility]. Confirmation received of successful transmission. Reviewed and electronically signed by:  [address] [facility], [address] [place] [address], [facility]

## 2023-07-06 NOTE — TELEPHONE ENCOUNTER
Patient called to inform office gabapentin controlled substance in [place]. Pharmacy unable to transfer as it is controlled. Will require new script for [place].    Order PEND & routed to MD for approval.

## 2023-09-04 ENCOUNTER — TELEPHONE (OUTPATIENT)
Dept: FAMILY MEDICINE CLINIC | Facility: CLINIC | Age: 48
End: 2023-09-04

## 2023-09-05 RX ORDER — MONTELUKAST SODIUM 10 MG/1
10 TABLET ORAL NIGHTLY
Qty: 60 TABLET | Refills: 0 | Status: SHIPPED | OUTPATIENT
Start: 2023-09-05

## 2023-09-05 RX ORDER — MONTELUKAST SODIUM 10 MG/1
10 TABLET ORAL NIGHTLY
Qty: 90 TABLET | Refills: 0 | OUTPATIENT
Start: 2023-09-05

## 2023-09-05 NOTE — TELEPHONE ENCOUNTER
[facility] please call patient to assist with scheduling appointment with PCP    Protocol failed for appointment only  60 day refill given on 09/05/23, appointment needed for further refills.     Requested Prescriptions   Pending Prescriptions Disp Refills    MONTELUKAST 10 MG Oral Tab [Pharmacy Med Name: MONTELUKAST 10MG TABLETS] 90 tablet 0     Sig: TAKE 1 TABLET BY MOUTH EVERY NIGHT       Asthma & COPD Medication Protocol Failed - 9/4/2023  9:24 AM        Failed - In person appointment or virtual visit in the past 6 mos or appointment in next 3 mos     Recent Outpatient Visits              3 months ago Restless legs syndrome    [address], [address], [place], [place], [place]    Office Visit    6 months ago Restless legs syndrome    [address], [address], [place], [place], [place]    Office Visit    10 months ago Acute URI    Nanetta Hockey, [place]    Office Visit    1 year ago     [address], [address], [address] Only    1 year ago Leukopenia, unspecified type    [facility] Hematology Oncology Charles Eli MD    Office Visit                            Recent Outpatient Visits              3 months ago Restless legs syndrome    [address], [address], [place], [place], [place]    Office Visit    6 months ago Restless legs syndrome    [address], [address], [place], [place], [place]    Office Visit    10 months ago Acute URI    Nan[place] Hockey, [place],     Office Visit    1 year ago     [address], [address], [address] Only    1 year ago Leukopenia, unspecified type    [facility] Hematology Oncology Charles Eli MD    Office Visit

## 2023-09-06 RX ORDER — LORATADINE 10 MG/1
TABLET ORAL
Qty: 30 TABLET | Refills: 0 | Status: SHIPPED | OUTPATIENT
Start: 2023-09-06 | End: 2023-09-06

## 2023-09-06 RX ORDER — LORATADINE 10 MG/1
TABLET ORAL
Qty: 90 TABLET | Refills: 0 | Status: SHIPPED | OUTPATIENT
Start: 2023-09-06

## 2023-09-06 NOTE — TELEPHONE ENCOUNTER
Message noted: Chart reviewed and may refill medication as requested. Prescription sent to listed pharmacy. Pharmacy to notify patient.  Pt notified through [facility]

## 2023-09-06 NOTE — TELEPHONE ENCOUNTER
Message noted: Chart reviewed and may refill medication as requested. Prescription sent to listed pharmacy. Pharmacy to notify patient.  Pt notified through [facility]

## 2023-09-06 NOTE — TELEPHONE ENCOUNTER
Refill passed per [facility], [facility] protocol.      Pend for 30/0    Future Appointments   Date Time Provider Fady Wright   9/25/2023 11:10 AM Jose Luis Phoenix MD [facility]         Requested Prescriptions   Pending Prescriptions Disp Refills    MONTELUKAST 10 MG Oral Tab [Pharmacy Med Name: MONTELUKAST 10MG TABLETS] 90 tablet 0     Sig: TAKE 1 TABLET BY MOUTH NIGHTLY       Asthma & COPD Medication Protocol Passed - 9/5/2023  2:06 PM        Passed - In person appointment or virtual visit in the past 6 mos or appointment in next 3 mos     Recent Outpatient Visits              3 months ago Restless legs syndrome    [address], [address], [place], [place], [place]    Office Visit    6 months ago Restless legs syndrome    [address], [address], [place], [place], [place]    Office Visit    10 months ago Acute URI    [address] [place] [address], [place], [place]    Office Visit    1 year ago     [address], [address], [place]    Nurse Only    1 year ago Leukopenia, unspecified type    [facility] Hematology Oncology Xiomara Vasquez MD    Office Visit          Future Appointments         Provider Department Appt Notes    In 2 weeks Jose Luis Phoenix MD [address], [address] [facility] physical (last px 9-8-21)                 LORATADINE 10 MG Oral Tab [Pharmacy Med Name: LORATADINE 10MG TABLETS] 90 tablet 0     Sig: TAKE 1 TABLET BY MOUTH DAILY       Allergy Medication Protocol Passed - 9/5/2023  2:06 PM        Passed - In person appointment or virtual visit in the past 12 mos or appointment in next 3 mos     Recent Outpatient Visits              3 months ago Restless legs syndrome    [address], [address], [place], [place], [place]    Office Visit    6 months ago Restless legs syndrome    [address], [address], [place], [place], [place] Office Visit    10 months ago Acute URI    [place], [place]    Office Visit    1 year ago     Tuan Richards, [address], [place]    Nurse Only    1 year ago Leukopenia, unspecified type    [facility] Hematology Oncology Denver Jaimes MD    Office Visit          Future Appointments         Provider Department Appt Notes    In 2 weeks MD Tuan Ray [facility] physical (last px 9-8-21)

## 2023-09-25 ENCOUNTER — OFFICE VISIT (OUTPATIENT)
Dept: FAMILY MEDICINE CLINIC | Facility: CLINIC | Age: 48
End: 2023-09-25

## 2023-09-25 VITALS
TEMPERATURE: 98 F | DIASTOLIC BLOOD PRESSURE: 78 MMHG | SYSTOLIC BLOOD PRESSURE: 116 MMHG | HEIGHT: 71.7 IN | RESPIRATION RATE: 16 BRPM | BODY MASS INDEX: 26.7 KG/M2 | WEIGHT: 195 LBS | HEART RATE: 71 BPM

## 2023-09-25 DIAGNOSIS — J31.0 RHINITIS, UNSPECIFIED TYPE: ICD-10-CM

## 2023-09-25 DIAGNOSIS — K64.8 INTERNAL HEMORRHOID: ICD-10-CM

## 2023-09-25 DIAGNOSIS — Z00.00 ROUTINE PHYSICAL EXAMINATION: Primary | ICD-10-CM

## 2023-09-25 PROCEDURE — 90686 IIV4 VACC NO PRSV 0.5 ML IM: CPT | Performed by: FAMILY MEDICINE

## 2023-09-25 PROCEDURE — 3074F SYST BP LT 130 MM HG: CPT | Performed by: FAMILY MEDICINE

## 2023-09-25 PROCEDURE — 3008F BODY MASS INDEX DOCD: CPT | Performed by: FAMILY MEDICINE

## 2023-09-25 PROCEDURE — 99396 PREV VISIT EST AGE 40-64: CPT | Performed by: FAMILY MEDICINE

## 2023-09-25 PROCEDURE — 90471 IMMUNIZATION ADMIN: CPT | Performed by: FAMILY MEDICINE

## 2023-09-25 PROCEDURE — 3078F DIAST BP <80 MM HG: CPT | Performed by: FAMILY MEDICINE

## 2023-09-25 RX ORDER — METHSCOPOLAMINE BROMIDE 2.5 MG/1
2.5 TABLET ORAL 2 TIMES DAILY
Qty: 180 TABLET | Refills: 1 | Status: SHIPPED | OUTPATIENT
Start: 2023-09-25

## 2023-09-25 RX ORDER — METHSCOPOLAMINE BROMIDE 2.5 MG/1
2.5 TABLET ORAL 2 TIMES DAILY
Qty: 180 TABLET | Refills: 0 | Status: CANCELLED | OUTPATIENT
Start: 2023-09-25

## 2023-09-25 NOTE — PROGRESS NOTES
Subjective:   Patient ID: Luisa Beatty is a 56year old male. Patient is here for routine physical exam. No acute issues. No significant chronic medical problems. Patient is requesting testing. Diet and exercise have been good. Interested in flu vaccine. Past medical history, family history, and social history were reviewed. Hx of chronic rhinitis. Stable with medication. Had seen ENT in past.   Also hx of internal hemorrhoids. Has had some bleeding and hx of hemorrhoids. No sig pains. Requesting referral for specialist to discuss treating this. Has had hx of procedure/ banding in past.        History/Other:   Review of Systems   Constitutional: Negative. Negative for fever. HENT: Negative. Rhinorrhea: hx of rhinitis. Eyes: Negative. Respiratory: Negative. Negative for cough and shortness of breath. Cardiovascular: Negative. Negative for chest pain. Gastrointestinal: Negative. Negative for abdominal pain, blood in stool (from hemorrhoids), rectal pain and vomiting. Endocrine: Negative. Genitourinary: Negative. Negative for difficulty urinating and dysuria. Musculoskeletal: Negative. Skin: Negative. Neurological: Negative. Negative for dizziness, light-headedness and headaches. Hematological: Negative. Psychiatric/Behavioral: Negative. Negative for dysphoric mood. The patient is not nervous/anxious. Current Outpatient Medications   Medication Sig Dispense Refill    methscopolamine 2.5 MG Oral Tab Take 1 tablet (2.5 mg total) by mouth 2 (two) times daily. 180 tablet 1    LORATADINE 10 MG Oral Tab TAKE 1 TABLET BY MOUTH DAILY 90 tablet 0    montelukast 10 MG Oral Tab Take 1 tablet (10 mg total) by mouth nightly. Appointment needed for further refills. 60 tablet 0    gabapentin 600 MG Oral Tab Take 1 tablet (600 mg total) by mouth at bedtime. 90 tablet 0    Melatonin 10 MG Oral Tab Take by mouth.       Multiple Vitamins-Minerals (MULTI-VITAMIN/MINERALS) Oral Tab Take 1 tablet by mouth daily. Allergies:No Known Allergies    Objective:   Physical Exam  Constitutional:       Appearance: Normal appearance. He is well-developed. HENT:      Head: Normocephalic. Right Ear: Tympanic membrane, ear canal and external ear normal.      Left Ear: Tympanic membrane, ear canal and external ear normal.      Nose: Nose normal.      Mouth/Throat:      Mouth: Mucous membranes are moist.      Pharynx: No oropharyngeal exudate or posterior oropharyngeal erythema. Eyes:      Conjunctiva/sclera: Conjunctivae normal.   Cardiovascular:      Rate and Rhythm: Normal rate and regular rhythm. Pulses: Normal pulses. Heart sounds: Normal heart sounds. Pulmonary:      Effort: Pulmonary effort is normal. No respiratory distress. Breath sounds: Normal breath sounds. No wheezing or rales. Abdominal:      General: Abdomen is flat. There is no distension. Palpations: Abdomen is soft. There is no mass. Tenderness: There is no abdominal tenderness. Musculoskeletal:         General: Normal range of motion. Cervical back: Normal range of motion and neck supple. Skin:     General: Skin is warm. Neurological:      General: No focal deficit present. Mental Status: He is alert and oriented to person, place, and time. Sensory: No sensory deficit. Deep Tendon Reflexes: Reflexes are normal and symmetric. Reflexes normal.   Psychiatric:         Mood and Affect: Mood normal.         Behavior: Behavior normal.         Assessment & Plan:   Routine physical examination:  - Exam is unremarkable. Screening tests were discussed, and after discussion, will check lab work as below. Healthy diet, exercise, and weight were discussed. To call if problems and follow up and further management after testing. Routine follow up.  Flu vaccine provided today    Internal hemorrhoid:  - After discussion, will send to general surgery for further evaluation and treatment; To call if any significant symptoms. Rhinitis, unspecified type:  - Stable: medication reviewed and renewed; To continue present treatment; To call if problems; Routine follow up in 6-12 months. Orders Placed This Encounter      CBC, Platelet; No Differential      Comp Metabolic Panel (14)      Lipid Panel      PSA Total, Screen      Fluzone Quadrivalent 6mo+ 0.5mL      Meds This Visit:  Requested Prescriptions     Signed Prescriptions Disp Refills    methscopolamine 2.5 MG Oral Tab 180 tablet 1     Sig: Take 1 tablet (2.5 mg total) by mouth 2 (two) times daily.        Imaging & Referrals:  SURGERY - INTERNAL  INFLUENZA VACCINE, QUAD, PRESERVATIVE FREE, 0.5 ML

## 2023-10-06 ENCOUNTER — LAB ENCOUNTER (OUTPATIENT)
Dept: LAB | Age: 48
End: 2023-10-06
Attending: FAMILY MEDICINE
Payer: COMMERCIAL

## 2023-10-06 DIAGNOSIS — Z00.00 ROUTINE PHYSICAL EXAMINATION: ICD-10-CM

## 2023-10-06 LAB
ALBUMIN SERPL-MCNC: 4.3 G/DL (ref 3.4–5)
ALBUMIN/GLOB SERPL: 1.2 {RATIO} (ref 1–2)
ALP LIVER SERPL-CCNC: 56 U/L
ALT SERPL-CCNC: 25 U/L
ANION GAP SERPL CALC-SCNC: 7 MMOL/L (ref 0–18)
AST SERPL-CCNC: 21 U/L (ref 15–37)
BILIRUB SERPL-MCNC: 0.7 MG/DL (ref 0.1–2)
BUN BLD-MCNC: 12 MG/DL (ref 7–18)
BUN/CREAT SERPL: 11.7 (ref 10–20)
CALCIUM BLD-MCNC: 9.9 MG/DL (ref 8.5–10.1)
CHLORIDE SERPL-SCNC: 106 MMOL/L (ref 98–112)
CHOLEST SERPL-MCNC: 195 MG/DL (ref ?–200)
CO2 SERPL-SCNC: 26 MMOL/L (ref 21–32)
COMPLEXED PSA SERPL-MCNC: 0.99 NG/ML (ref ?–4)
CREAT BLD-MCNC: 1.03 MG/DL
DEPRECATED RDW RBC AUTO: 41.5 FL (ref 35.1–46.3)
EGFRCR SERPLBLD CKD-EPI 2021: 91 ML/MIN/1.73M2 (ref 60–?)
ERYTHROCYTE [DISTWIDTH] IN BLOOD BY AUTOMATED COUNT: 11.8 % (ref 11–15)
FASTING PATIENT LIPID ANSWER: YES
FASTING STATUS PATIENT QL REPORTED: YES
GLOBULIN PLAS-MCNC: 3.5 G/DL (ref 2.8–4.4)
GLUCOSE BLD-MCNC: 96 MG/DL (ref 70–99)
HCT VFR BLD AUTO: 45.1 %
HDLC SERPL-MCNC: 61 MG/DL (ref 40–59)
HGB BLD-MCNC: 15.3 G/DL
LDLC SERPL CALC-MCNC: 122 MG/DL (ref ?–100)
MCH RBC QN AUTO: 32.8 PG (ref 26–34)
MCHC RBC AUTO-ENTMCNC: 33.9 G/DL (ref 31–37)
MCV RBC AUTO: 96.6 FL
NONHDLC SERPL-MCNC: 134 MG/DL (ref ?–130)
OSMOLALITY SERPL CALC.SUM OF ELEC: 288 MOSM/KG (ref 275–295)
PLATELET # BLD AUTO: 188 10(3)UL (ref 150–450)
POTASSIUM SERPL-SCNC: 4.2 MMOL/L (ref 3.5–5.1)
PROT SERPL-MCNC: 7.8 G/DL (ref 6.4–8.2)
RBC # BLD AUTO: 4.67 X10(6)UL
SODIUM SERPL-SCNC: 139 MMOL/L (ref 136–145)
TRIGL SERPL-MCNC: 65 MG/DL (ref 30–149)
VLDLC SERPL CALC-MCNC: 11 MG/DL (ref 0–30)
WBC # BLD AUTO: 3.4 X10(3) UL (ref 4–11)

## 2023-10-06 PROCEDURE — 80053 COMPREHEN METABOLIC PANEL: CPT

## 2023-10-06 PROCEDURE — 85027 COMPLETE CBC AUTOMATED: CPT

## 2023-10-06 PROCEDURE — 80061 LIPID PANEL: CPT

## 2023-10-06 PROCEDURE — 36415 COLL VENOUS BLD VENIPUNCTURE: CPT

## 2023-10-10 ENCOUNTER — PATIENT MESSAGE (OUTPATIENT)
Dept: FAMILY MEDICINE CLINIC | Facility: CLINIC | Age: 48
End: 2023-10-10

## 2023-11-13 ENCOUNTER — OFFICE VISIT (OUTPATIENT)
Dept: SURGERY | Facility: CLINIC | Age: 48
End: 2023-11-13
Payer: COMMERCIAL

## 2023-11-13 VITALS — HEIGHT: 72 IN | WEIGHT: 190 LBS | BODY MASS INDEX: 25.73 KG/M2

## 2023-11-13 DIAGNOSIS — K64.9 BLEEDING HEMORRHOIDS: Primary | ICD-10-CM

## 2023-11-13 NOTE — H&P
History and Physical      HPI       HPI  Luisa Beatty is a 53year old male who presents with bleeding hemorrhoids. 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 He had a hemorrhoidectomy in 2007 which was very traumatic to him spent 2 weeks in the hospital with bleeding. Past Medical History:   Diagnosis Date    Allergic rhinitis     Broken jaw ([address]     Esophageal reflux     Leukopenia 7/25/2014    Lipid screening 6/16/2014    per:[facility]    Macrocytosis without anemia 7/25/2014    Nasal septal deviation      Past Surgical History:   Procedure Laterality Date    COLONOSCOPY N/A 7/11/2017    Procedure: COLONOSCOPY;  Surgeon: Hubert Kelly MD;  Location: [address] [place] [address] ENDOSCOPY    COLONOSCOPY N/A 7/12/2022    Procedure: COLONOSCOPY;  Surgeon: Hubert Kelly MD;  Location: [address] [place] [address] ENDOSCOPY    HEMORRHOIDECTOMY  2006    LASIK  2010    [address] [place] [address]    broken jaw    RECONSTR NOSE  2004    septoplasty     Current Outpatient Medications   Medication Sig Dispense Refill    methscopolamine 2.5 MG Oral Tab Take 1 tablet (2.5 mg total) by mouth 2 (two) times daily. 180 tablet 1    LORATADINE 10 MG Oral Tab TAKE 1 TABLET BY MOUTH DAILY 90 tablet 0    montelukast 10 MG Oral Tab Take 1 tablet (10 mg total) by mouth nightly. Appointment needed for further refills. 60 tablet 0    Melatonin 10 MG Oral Tab Take by mouth. Multiple Vitamins-Minerals (MULTI-VITAMIN/MINERALS) Oral Tab Take 1 tablet by mouth daily.        ALLERGIES  No Known Allergies    Social History     Socioeconomic History    Marital status:    Tobacco Use    Smoking status: Never    Smokeless tobacco: Never   Vaping Use    Vaping Use: Never used   Substance and Sexual Activity    Alcohol use: Yes     Comment: A few times per week    Drug use: No     Comment: CBD gummies nightly     Family History   Problem Relation Age of Onset    Other (Restless legs syndrome) Mother     Hypertension Mother     Colon Cancer Father 48    Other (Parkinson's) Maternal Grandmother     Colon Cancer Paternal Grandmother     Other [place] Lev) Paternal Grandmother     Colon Cancer Maternal Aunt 48    Colon Cancer Other         family h/o    Hypertension Other         family h/o    Neurological Disorder Other         family h/o Parkinson's Disease       Review of Systems   A comprehensive 10 point review of systems was completed. Pertinent positives and negatives noted in the the HPI. PHYSICAL EXAM   There were no vitals taken for this visit. No LMP for male patient. Constitutional: appears well hydrated alert and responsive no acute distress noted  Head/Face: normocephalic  Nose/Mouth/Throat: nose and throat are clear palate is intact mucous membranes are moist no oral lesions are noted  Neck/Thyroid: neck is supple without adenopathy  Respiratory: normal to inspection lungs are clear to auscultation bilaterally normal respiratory effort  Cardiovascular: regular rate and rhythm no murmurs, gallups, or rubs  Abdomen: soft non-tender non-distended no organomegaly noted no masses  Extremities: no edema, cyanosis, or clubbing  Neurological: exam appropriate for age reflexes and motor skills appropriate for age    Digital anoscopic exam is performed.-Large circumferential internal hemorrhoids and evidence of venous hypertension    ASSESSMENT/PLAN   Assessment   Encounter Diagnosis   Name Primary? Bleeding hemorrhoids Yes       53year old male with internal hemorrhoids. We have discussed the surgical risks, benefits, alternatives, and expected recovery. We will plan evaluation with colorectal surgery for possible Doppler ligation. All of the patient's questions have been answered to his satisfaction.        11/13/2023  Briseida Villalobos MD

## 2023-11-13 NOTE — PATIENT INSTRUCTIONS
Please call Dr Aaron Bowser- colorectal surgery RE- Doppler ligation of internal hemorrhoids  [phone]    Cont high fiber diet, 6-8 glasses of water

## 2023-11-22 ENCOUNTER — APPOINTMENT (OUTPATIENT)
Dept: URBAN - METROPOLITAN AREA CLINIC 244 | Age: 48
Setting detail: DERMATOLOGY
End: 2023-11-23

## 2023-11-22 DIAGNOSIS — L72.0 EPIDERMAL CYST: ICD-10-CM

## 2023-11-22 DIAGNOSIS — D22 MELANOCYTIC NEVI: ICD-10-CM

## 2023-11-22 PROBLEM — D22.4 MELANOCYTIC NEVI OF SCALP AND NECK: Status: ACTIVE | Noted: 2023-11-22

## 2023-11-22 PROCEDURE — OTHER COUNSELING: OTHER

## 2023-11-22 PROCEDURE — 10060 I&D ABSCESS SIMPLE/SINGLE: CPT

## 2023-11-22 PROCEDURE — OTHER PRESCRIPTION: OTHER

## 2023-11-22 PROCEDURE — OTHER INCISION AND DRAINAGE: OTHER

## 2023-11-22 PROCEDURE — 99213 OFFICE O/P EST LOW 20 MIN: CPT | Mod: 25

## 2023-11-22 PROCEDURE — OTHER PRESCRIPTION MEDICATION MANAGEMENT: OTHER

## 2023-11-22 PROCEDURE — OTHER SEPARATE AND IDENTIFIABLE DOCUMENTATION: OTHER

## 2023-11-22 RX ORDER — DOXYCYCLINE 100 MG/1
CAPSULE ORAL
Qty: 60 | Refills: 1 | Status: ERX | COMMUNITY
Start: 2023-11-22

## 2023-11-22 ASSESSMENT — LOCATION DETAILED DESCRIPTION DERM
LOCATION DETAILED: RIGHT POSTAURICULAR SKIN
LOCATION DETAILED: SUPRAPUBIC SKIN

## 2023-11-22 ASSESSMENT — LOCATION SIMPLE DESCRIPTION DERM
LOCATION SIMPLE: GROIN
LOCATION SIMPLE: POSTERIOR SCALP

## 2023-11-22 ASSESSMENT — LOCATION ZONE DERM
LOCATION ZONE: SCALP
LOCATION ZONE: TRUNK

## 2023-11-22 NOTE — PROCEDURE: PRESCRIPTION MEDICATION MANAGEMENT
Initiate Treatment: Doxycycline 100mg BID x 1 month
Render In Strict Bullet Format?: No
Detail Level: Zone

## 2023-11-22 NOTE — PROCEDURE: INCISION AND DRAINAGE
Detail Level: Detailed
Lesion Type: Cyst
Method: 15 blade
Curette: No
Size Of Lesion In Cm (Optional But May Be Required For Some Insurances): 0
Drainage Amount?: minimal
Drainage Type?: bloody and cyst-like
Wound Care: Petrolatum
Dressing: dry sterile dressing
Epidermal Sutures: 4-0 Ethilon
Epidermal Closure: simple interrupted
Suture Text: The incision was partially closed with
Preparation Text: The area was prepped in the usual clean fashion.
Curette Text (Optional): After the contents were expressed a curette was used to partially remove the cyst wall.
Consent was obtained and risks were reviewed including but not limited to delayed wound healing, infection, need for multiple I and D's, and pain.
Post-Care Instructions: I reviewed with the patient in detail post-care instructions. Patient should keep wound covered and call the office should any redness, pain, swelling or worsening occur.

## 2023-11-25 ENCOUNTER — MED REC SCAN ONLY (OUTPATIENT)
Dept: FAMILY MEDICINE CLINIC | Facility: CLINIC | Age: 48
End: 2023-11-25

## 2023-12-06 RX ORDER — LORATADINE 10 MG/1
TABLET ORAL
Qty: 90 TABLET | Refills: 3 | Status: SHIPPED | OUTPATIENT
Start: 2023-12-06

## 2023-12-06 NOTE — TELEPHONE ENCOUNTER
Refill passed per [facility] protocol   Requested Prescriptions   Pending Prescriptions Disp Refills    LORATADINE 10 MG Oral Tab [Pharmacy Med Name: LORATADINE 10MG TABLETS] 90 tablet 0     Sig: TAKE 1 TABLET BY MOUTH DAILY       Allergy Medication Protocol Passed - 12/6/2023  9:07 AM        Passed - In person appointment or virtual visit in the past 12 mos or appointment in next 3 mos     Recent Outpatient Visits              3 weeks ago Bleeding hemorrhoids    Loreto Stacy MD    Office Visit    2 months ago Routine physical examination    Maile Eason MD    Office Visit    6 months ago Restless legs syndrome    [address], [address], [place], [address] [facility], [place]    Office Visit    9 months ago Restless legs syndrome    [address], [address], [place], [address] [facility], [place]    Office Visit    1 year ago Acute URI    [address], [place]    Office Visit

## 2023-12-11 ENCOUNTER — MED REC SCAN ONLY (OUTPATIENT)
Dept: FAMILY MEDICINE CLINIC | Facility: CLINIC | Age: 48
End: 2023-12-11

## 2023-12-19 ENCOUNTER — MED REC SCAN ONLY (OUTPATIENT)
Dept: FAMILY MEDICINE CLINIC | Facility: CLINIC | Age: 48
End: 2023-12-19

## 2024-01-25 RX ORDER — MONTELUKAST SODIUM 10 MG/1
10 TABLET ORAL NIGHTLY
Qty: 90 TABLET | Refills: 0 | Status: SHIPPED | OUTPATIENT
Start: 2024-01-25

## 2024-01-25 NOTE — TELEPHONE ENCOUNTER
Message noted: Chart reviewed and may refill medication as requested. Prescription sent to listed pharmacy. Pharmacy to notify patient. Pt notified through [employer]

## 2024-04-02 ENCOUNTER — TELEMEDICINE (OUTPATIENT)
Dept: FAMILY MEDICINE CLINIC | Facility: CLINIC | Age: 49
End: 2024-04-02
Payer: COMMERCIAL

## 2024-04-02 DIAGNOSIS — R19.7 DIARRHEA, UNSPECIFIED TYPE: Primary | ICD-10-CM

## 2024-04-02 PROCEDURE — 99213 OFFICE O/P EST LOW 20 MIN: CPT | Performed by: FAMILY MEDICINE

## 2024-04-02 RX ORDER — METHSCOPOLAMINE BROMIDE 2.5 MG/1
2.5 TABLET ORAL 2 TIMES DAILY
Qty: 180 TABLET | Refills: 3 | Status: SHIPPED | OUTPATIENT
Start: 2024-04-02

## 2024-04-02 NOTE — PROGRESS NOTES
Subjective:   Patient ID: Ashok Akbar is a 48 year old male.    This visit is conducted using Telemedicine with live, interactive video and audio during this Coronavirus pandemic.    Please note that the following visit was completed using two-way, real-time interactive audio and/or video communication.  This has been done in good jose to provide continuity of care in the best interest of the provider-patient relationship, due to the ongoing public health crisis/national emergency and because of restrictions of visitation.  There are limitations of this visit as no physical exam could be performed.  Every conscious effort was taken to allow for sufficient and adequate time.  This billing was spent on reviewing labs, medications, radiology tests and decision making.  Appropriate medical decision-making and tests are ordered as detailed in the plan of care above    Virtual Telephone Check-In    Ashok Akbar verbally consents to a Virtual/Telephone Check-In visit on 04/02/24.  Patient has been referred to the [facility] website at [email or web address] to review the yearly Consent to Treat document.    Patient understands and accepts financial responsibility for any deductible, co-insurance and/or co-pays associated with this service.    Duration of the service: 5 minutes      Summary of topics discussed: diarrhea    Pt presents with intermittent diarrhea over the last few months. Pt taking supplement- creatine. No change in diet.  No sick contacts or suspicious ingestions. Pt states no bleeding or abdominal pains. No travel of sig abx use.   Has been taking otc remedies for cough.     Dallas Muniz MD                  History/Other:   Review of Systems   Constitutional:  Negative for fever.   Gastrointestinal:  Negative for abdominal pain, blood in stool, nausea and vomiting. Diarrhea: loose.    Current Outpatient Medications   Medication Sig Dispense Refill    montelukast 10 MG Oral Tab TAKE 1  TABLET BY MOUTH NIGHTLY 90 tablet 0    loratadine 10 MG Oral Tab TAKE 1 TABLET BY MOUTH DAILY 90 tablet 3    methscopolamine 2.5 MG Oral Tab Take 1 tablet (2.5 mg total) by mouth 2 (two) times daily. 180 tablet 1    Melatonin 10 MG Oral Tab Take by mouth.      Multiple Vitamins-Minerals (MULTI-VITAMIN/MINERALS) Oral Tab Take 1 tablet by mouth daily.       Allergies:No Known Allergies    Objective:   Physical Exam  Constitutional:       Appearance: Normal appearance.   Neurological:      Mental Status: He is alert.   Psychiatric:         Mood and Affect: Mood normal.         Behavior: Behavior normal.         Assessment & Plan:   1. Diarrhea, unspecified type:  - After discussion with patient, will check stool testing as discussed below; To call if any significant symptoms. Follow up and further management after testing. Discussed bland diet. Consider follow up with GI as discussed also.    -  VIDEO VISIT done.         Orders Placed This Encounter   Procedures    C. diff toxigenic PCR (OPT)    Stool Culture W/Shigatoxin       Meds This Visit:  Requested Prescriptions      No prescriptions requested or ordered in this encounter       Imaging & Referrals:  None

## 2024-04-03 NOTE — TELEPHONE ENCOUNTER
Refill passed per [facility] protocol.     Requested Prescriptions   Pending Prescriptions Disp Refills    METHSCOPOLAMINE 2.5 MG Oral Tab [Pharmacy Med Name: METHSCOPOLAMINE BR 2.5MG TABLETS] 180 tablet 1     Sig: TAKE 1 TABLET(2.5 MG) BY MOUTH TWICE DAILY       Gastrointestional Medication Protocol Passed - 3/31/2024 11:09 AM        Passed - In person appointment or virtual visit in the past 12 mos or appointment in next 3 mos     Recent Outpatient Visits              Today Diarrhea, unspecified type    [facility]    [facility]    4 months ago [facility] hemorrhoids    [facility]    [facility]    6 months ago [facility]    [facility]    10 months ago [facility] legs syndrome    [facility] [place],     Office Visit    1 year ago Restless legs syndrome    [facility] [place], [facility]    Office Visit                                 Recent Outpatient Visits              Today Diarrhea, unspecified type    [facility]    [facility]    4 months ago [facility]eding hemorrhoids    [facility]    [facility]    6 months ago Routine physical examination    [facility]    [facility]    10 months ago [facility]less legs syndrome    [facility]     [facility]    1 year ago Restless legs syndrome    [facility] [place] [facility] [place], DO    Office Visit

## 2024-04-09 ENCOUNTER — LAB ENCOUNTER (OUTPATIENT)
Dept: LAB | Age: 49
End: 2024-04-09
Attending: FAMILY MEDICINE
Payer: COMMERCIAL

## 2024-04-09 DIAGNOSIS — R19.7 DIARRHEA, UNSPECIFIED TYPE: ICD-10-CM

## 2024-04-09 PROCEDURE — 87045 FECES CULTURE AEROBIC BACT: CPT

## 2024-04-09 PROCEDURE — 87046 STOOL CULTR AEROBIC BACT EA: CPT

## 2024-04-09 PROCEDURE — 87493 C DIFF AMPLIFIED PROBE: CPT

## 2024-04-09 PROCEDURE — 87427 SHIGA-LIKE TOXIN AG IA: CPT

## 2024-04-10 ENCOUNTER — PATIENT MESSAGE (OUTPATIENT)
Dept: FAMILY MEDICINE CLINIC | Facility: CLINIC | Age: 49
End: 2024-04-10

## 2024-04-10 LAB — C DIFF TOX B STL QL: NEGATIVE

## 2024-04-11 NOTE — TELEPHONE ENCOUNTER
From: Ashok Akbar  To: Dallas Muniz  Sent: 4/10/2024 2:56 PM CDT  Subject: Test results    Patience Muniz,  Thanks for calling and leaving a . My results are negative, but I am still having very loose stool every day. So, is the next step to meet with the Gastroenterologist? What should I do next?  Thanks

## 2024-04-19 ENCOUNTER — PATIENT MESSAGE (OUTPATIENT)
Dept: NEUROLOGY | Facility: CLINIC | Age: 49
End: 2024-04-19

## 2024-04-19 NOTE — TELEPHONE ENCOUNTER
Dr. Grider, please review and advise.  Thanks.  Reviewed and electronically signed by: IRVIN Villa

## 2024-04-19 NOTE — TELEPHONE ENCOUNTER
From: Ashok Akbar  To: Ale Grider  Sent: 4/19/2024 11:46 AM CDT  Subject: Gabapentin issues    Hi Dr. Grider,  You currently have me on 600 MG of Gabapentin. It initially was working well, but more and more frequently my legs continue to give me troubles at night when I try to sleep. Thus, I end up adding in a couple/few Tylenol or Motrin to help ease it (which it usually does).    Last night, I added 3 tylenol and still had some restlessness, so I cut a gabapentin in half and took that - thus, taking 900 MG. That helped and I fell asleep right after.    I am due for a refill of Gabapentin, but am wondering if I should either a) do higher MG, or b) try something else?

## 2024-04-23 RX ORDER — GABAPENTIN 600 MG/1
900 TABLET ORAL 3 TIMES DAILY
Qty: 405 TABLET | Refills: 0 | Status: SHIPPED | OUTPATIENT
Start: 2024-04-23 | End: 2024-07-22

## 2024-04-24 RX ORDER — MONTELUKAST SODIUM 10 MG/1
10 TABLET ORAL NIGHTLY
Qty: 90 TABLET | Refills: 3 | Status: SHIPPED | OUTPATIENT
Start: 2024-04-24

## 2024-04-24 NOTE — TELEPHONE ENCOUNTER
Please review; protocol failed.    Requested Prescriptions   Pending Prescriptions Disp Refills    MONTELUKAST 10 MG Oral Tab [Pharmacy Med Name: MONTELUKAST 10MG TABLETS] 90 tablet 0     Sig: TAKE 1 TABLET BY MOUTH EVERY NIGHT       Asthma & COPD Medication Protocol Failed - 4/22/2024 12:04 PM        Failed - Asthma Action Score greater than or equal to 20        Failed - AAP/ACT given in last 12 months     No data recorded  No data recorded  No data recorded  No data recorded          Passed - Appointment in past 6 or next 3 months      Recent Outpatient Visits              3 weeks [facility], [facility]    [facility]    5 months ago [facility] hemorrhoids    [facility]    [facility]    7 months ago [facility]ine physical examination    [facility]    [facility]    10 months ago Restless legs syndrome    [facility]    [facility]    1 year ago Restless legs syndrome    [facility]     Office Visit

## 2024-04-24 NOTE — TELEPHONE ENCOUNTER
Message noted: Chart reviewed and may refill medication as requested. Prescription sent to listed pharmacy. Pharmacy to notify patient. Pt notified through [email or web address]

## 2024-08-11 ENCOUNTER — PATIENT MESSAGE (OUTPATIENT)
Dept: NEUROLOGY | Facility: CLINIC | Age: 49
End: 2024-08-11

## 2024-08-11 DIAGNOSIS — G25.81 RESTLESS LEGS SYNDROME: ICD-10-CM

## 2024-08-11 DIAGNOSIS — M62.838 MUSCLE SPASM: Primary | ICD-10-CM

## 2024-08-12 NOTE — TELEPHONE ENCOUNTER
From: Ashok Akbar  To: Ale Grider  Sent: 8/11/2024 2:03 PM CDT  Subject: RX refill    Hi Dr Grider,  You [facility] [place] [place]. I have ran out and I’m in need of a refill. In addition, you mention upping the gabapentin to 900 MG instead of 600 MG..  I’m struggling these last couple nights without it from my sleeping legs and would appreciate if you can send that in.  Thank you!

## 2024-08-13 RX ORDER — GABAPENTIN 600 MG/1
900 TABLET ORAL 3 TIMES DAILY
Qty: 135 TABLET | Refills: 0 | Status: SHIPPED | OUTPATIENT
Start: 2024-08-13 | End: 2024-09-12

## 2025-01-21 RX ORDER — LORATADINE 10 MG/1
TABLET ORAL
Qty: 90 TABLET | Refills: 3 | Status: SHIPPED | OUTPATIENT
Start: 2025-01-21

## 2025-04-28 RX ORDER — METHSCOPOLAMINE BROMIDE 2.5 MG/1
2.5 TABLET ORAL 2 TIMES DAILY
Qty: 180 TABLET | Refills: 3 | Status: SHIPPED | OUTPATIENT
Start: 2025-04-28

## 2025-04-28 NOTE — TELEPHONE ENCOUNTER
Message noted: Chart reviewed and may refill medication as requested times one. Prescription sent to listed pharmacy. Pharmacy to notify patient to make appointment for further refills  Pt notified through [email or web address] also.

## (undated) DEVICE — SNARE ENDOSCOPIC 10MM ROUND

## (undated) DEVICE — SNARE CAPTIFLEX MICRO-OVL OLY

## (undated) DEVICE — FORCEP RADIAL JAW 4

## (undated) DEVICE — 35 ML SYRINGE REGULAR TIP: Brand: MONOJECT

## (undated) DEVICE — MEDI-VAC NON-CONDUCTIVE SUCTION TUBING 6MM X 1.8M (6FT.) L: Brand: CARDINAL HEALTH

## (undated) DEVICE — SNARE OPTMZ PLPCTM TRP

## (undated) DEVICE — LINE MNTR ADLT SET O2 INTMD

## (undated) DEVICE — Device: Brand: DEFENDO AIR/WATER/SUCTION AND BIOPSY VALVE

## (undated) DEVICE — ENDOSCOPY PACK - LOWER: Brand: MEDLINE INDUSTRIES, INC.

## (undated) DEVICE — ENDOSCOPY PACK UPPER: Brand: MEDLINE INDUSTRIES, INC.

## (undated) DEVICE — KIT CLEAN ENDOKIT 1.1OZ GOWNX2

## (undated) DEVICE — KIT ENDO ORCAPOD 160/180/190

## (undated) NOTE — MR AVS SNAPSHOT
[facility]  [address] [place] [address] [place] [address] [phone]               Thank you for choosing us for your health care visit with Bethanne Hammans, APN. We are glad to serve you and happy to provide you with this summary of your visit. These medications were sent to [place] [address] [facility], [place] - [address], [phone], [phone] [address] [facility], [address] [facility] [address]     Phone:  [phone] - na Sulfate-K S

## (undated) NOTE — LETTER
[address], [place]  Authorization for Invasive Procedures  1. I hereby authorize Dr Martin Reed, my physician and whomever may be designated as the doctor's assistant, to perform the following operation and/or procedure:  COLONOSCOPY on [facility].    2. My physician has explained to me the nature and purpose of the operation or other procedure, possible alternative methods of treatment, the risks involved and the possibility of complications to me. I understand the probable consequences of declining the recommended procedure and the alternative methods of treatment. I acknowledge that no guarantee has been made as to the result that may be obtained. 3. I recognize that during the course of this operation or other procedure, unforeseen conditions may necessitate additional or different procedures than those listed above. I, therefore, further authorize and request that the above-named physician, his/her physician assistants, or designees perform such procedures as are, in his/her professional opinion, necessary and desirable. If I have a Do Not Attempt Resuscitation (DNAR) order in place, that status will be suspended while in the operating room, procedural suite, and during the recovery period unless otherwise explicitly stated by me (or a person authorized to consent on my behalf). The surgeon or my attending physician will determine when the applicable recovery period ends for purposes of reinstating the DNAR order. 4. Should the need arise during my operation or immediate post-operative period; I also consent to the administration of blood and/or blood products.  Further, I understand that despite careful testing and screening of blood and blood products, I may still be subject to ill effects as a result of recieving a blood transfusion an/or blood producst. The following are some, but not all, of the potential risks that can occur: fever and allergic reactions, hemolytic reactions, transmission of disease such as hepatitis, AIDS, cytomegalovirus (CMV), and flluid overload. In the event that I wish to have autologous transfusions of my own blood, or a directed donor transfusion, I will discuss this with my physician. 5. I consent to the photographing of the operations or procedures to be performed for the purposes of advancing medicine, science, and/or education, provided my identity is not revealed. If the procedure has been videotaped, the physician/surgeon will obtain the original videotape. The hospital will not be responsible for storage or maintenance of this tape. 6. I consent to the presence of a  or observer as deemed necessary by my physician or his designee. 7. Any tissues or organs removed in the operation or other procedure may be disposed of by and at the discretion of [facility].    8. I understand that the physician and his/her physician assistants may not be employees or agents of [facility], [facility], nor [facility], but are independent medical practitioners who have been permitted to use its facilities for the care and treatment of their patients. 9. Patients having a sterilization procedure: I understand that if the procedure is successful the results will be permanent and it will therefore be impossible for me to inseminate, conceive or bear children. I also understand that the procedure is intended to result in sterility, although the result has not been guaranteed. 10. I CERTIFY THAT I HAVE READ AND FULLY UNDERSTAND THE ABOVE CONSENT TO OPERATION and/or OTHER PROCEDURE. 11. I acknowledge that my physician has explained sedation/analgesia administration to me including the risks and benefits. I consent to the administration of sedation/analgesia as may be necessary or desirable in the judgment of my physician. Signature of Patient:  ________________________________________________ Date: _________Time: _________    Responsible person in case of minor or unconscious: _____________________________Relationship: ____________     Witness Signature: ____________________________________________ Date: __________ Time: ___________    Statement of Physician  My signature below affirms that prior to the time of the procedure, I have explained to the patient and/or his legal representative, the risks and benefits involved in the proposed treatment and any reasonable alternative to the proposed treatment. I have also explained the risks and benefits involved in the refusal of the proposed treatment and have answered the patient's questions. If I have a significant financial interest in this procedure/surgery, I have disclosed this and had a discussion with my patient.     Signature of Physician:   ________________________________________Date: _________Time:_______ Patient Name: Juan Jose Reyes  : 10/29/1975   Printed: 2022    Medical Record #: [id number]

## (undated) NOTE — MR AVS SNAPSHOT
[facility] - [place] [address] [address] [address] [phone]               Thank you for choosing us for your health care visit with Maren Tijerina MD.  We are glad to serve you and happy to provide you with this summary of y CBC W Differential W Platelet [E]    Complete by:  Jan 26, 2017 (Approximate)    Assoc Dx:  Routine physical examination [Z00.00]           PSA (Screening) [E]    Complete by:  Jan 26, 2017 (Approximate)    Assoc Dx:  Routine physical examination [U90.07] requirements for authorization, please wait 5-7 days and then contact your physician's   office. At that time, you will be provided with any authorization numbers or be assured that none are required. You can then schedule your appointment.  Failure to obta [address] [place] [address] [address]  [facility]  [phone]    If you are confident that your benefit plan will not require a referral or authorization, such as [employer] Make half your plate fruits and vegetables Highly refined, white starches including white bread, rice and pasta   Eat plenty of protein, keep the fat content low Sugars:  sodas and sports drinks, candies and desserts   Eat plenty of low-fat dairy products

## (undated) NOTE — MR AVS SNAPSHOT
[facility] - [place] [address] [place]  [address]  [phone]               Thank you for choosing us for your health care visit with Yuliet Francisco MD.  We are glad to serve you and happy to provide you with this summary of y If you are confident that your benefit plan will not require a referral or authorization, such as [place] [employer], please feel free to schedule your appointment immediately.  However, if you are unsure about the requirements for authorization, please wait Sign up for [employer], your secure online medical record. [employer] will allow you to access patient instructions from your recent visit,  view other health information, and more. To sign up or find more information, go to [email or web address]